# Patient Record
Sex: MALE | Race: WHITE | NOT HISPANIC OR LATINO
[De-identification: names, ages, dates, MRNs, and addresses within clinical notes are randomized per-mention and may not be internally consistent; named-entity substitution may affect disease eponyms.]

---

## 2017-02-13 ENCOUNTER — APPOINTMENT (OUTPATIENT)
Dept: DERMATOLOGY | Facility: CLINIC | Age: 72
End: 2017-02-13

## 2017-02-13 ENCOUNTER — RECORD ABSTRACTING (OUTPATIENT)
Age: 72
End: 2017-02-13

## 2017-02-13 DIAGNOSIS — L57.8 OTHER SKIN CHANGES DUE TO CHRONIC EXPOSURE TO NONIONIZING RADIATION: ICD-10-CM

## 2017-02-13 DIAGNOSIS — L21.8 OTHER SEBORRHEIC DERMATITIS: ICD-10-CM

## 2017-07-08 ENCOUNTER — RX RENEWAL (OUTPATIENT)
Age: 72
End: 2017-07-08

## 2017-08-24 ENCOUNTER — APPOINTMENT (OUTPATIENT)
Dept: DERMATOLOGY | Facility: CLINIC | Age: 72
End: 2017-08-24
Payer: MEDICARE

## 2017-08-24 VITALS — BODY MASS INDEX: 30.35 KG/M2 | WEIGHT: 230 LBS

## 2017-08-24 VITALS — BODY MASS INDEX: 30.48 KG/M2 | WEIGHT: 230 LBS | HEIGHT: 73 IN

## 2017-08-24 PROCEDURE — 99213 OFFICE O/P EST LOW 20 MIN: CPT

## 2017-09-07 ENCOUNTER — OTHER (OUTPATIENT)
Age: 72
End: 2017-09-07

## 2017-09-11 ENCOUNTER — APPOINTMENT (OUTPATIENT)
Dept: DERMATOLOGY | Facility: CLINIC | Age: 72
End: 2017-09-11

## 2017-09-11 ENCOUNTER — RX RENEWAL (OUTPATIENT)
Age: 72
End: 2017-09-11

## 2017-10-11 ENCOUNTER — APPOINTMENT (OUTPATIENT)
Dept: DERMATOLOGY | Facility: CLINIC | Age: 72
End: 2017-10-11
Payer: MEDICARE

## 2017-10-11 PROCEDURE — 99214 OFFICE O/P EST MOD 30 MIN: CPT

## 2017-10-11 RX ORDER — MOMETASONE FUROATE 1 MG/ML
0.1 LOTION TOPICAL
Refills: 0 | Status: DISCONTINUED | COMMUNITY
End: 2017-10-11

## 2017-10-11 RX ORDER — FLURANDRENOLIDE 4 UG/CM2
TAPE TOPICAL
Refills: 0 | Status: DISCONTINUED | COMMUNITY
End: 2017-10-11

## 2017-10-11 RX ORDER — CLOBETASOL PROPIONATE 0.5 MG/ML
0.05 SOLUTION TOPICAL
Refills: 0 | Status: DISCONTINUED | COMMUNITY
End: 2017-10-11

## 2017-10-11 RX ORDER — HYDROCORTISONE VALERATE 2 MG/G
0.2 OINTMENT TOPICAL
Refills: 0 | Status: DISCONTINUED | COMMUNITY
End: 2017-10-11

## 2017-10-12 LAB
ALBUMIN SERPL ELPH-MCNC: 4.2 G/DL
ALP BLD-CCNC: 48 U/L
ALT SERPL-CCNC: 16 U/L
ANION GAP SERPL CALC-SCNC: 13 MMOL/L
AST SERPL-CCNC: 12 U/L
BASOPHILS # BLD AUTO: 0.03 K/UL
BASOPHILS NFR BLD AUTO: 0.5 %
BILIRUB SERPL-MCNC: 0.4 MG/DL
BUN SERPL-MCNC: 19 MG/DL
CALCIUM SERPL-MCNC: 9.6 MG/DL
CHLORIDE SERPL-SCNC: 104 MMOL/L
CO2 SERPL-SCNC: 25 MMOL/L
CREAT SERPL-MCNC: 0.87 MG/DL
EOSINOPHIL # BLD AUTO: 0.09 K/UL
EOSINOPHIL NFR BLD AUTO: 1.5 %
GLUCOSE SERPL-MCNC: 170 MG/DL
HCT VFR BLD CALC: 40.4 %
HGB BLD-MCNC: 14 G/DL
IMM GRANULOCYTES NFR BLD AUTO: 0.2 %
LYMPHOCYTES # BLD AUTO: 1.4 K/UL
LYMPHOCYTES NFR BLD AUTO: 24.1 %
MAN DIFF?: NORMAL
MCHC RBC-ENTMCNC: 31.7 PG
MCHC RBC-ENTMCNC: 34.7 GM/DL
MCV RBC AUTO: 91.4 FL
MONOCYTES # BLD AUTO: 0.76 K/UL
MONOCYTES NFR BLD AUTO: 13.1 %
NEUTROPHILS # BLD AUTO: 3.53 K/UL
NEUTROPHILS NFR BLD AUTO: 60.6 %
PLATELET # BLD AUTO: 224 K/UL
POTASSIUM SERPL-SCNC: 4.8 MMOL/L
PROT SERPL-MCNC: 7.4 G/DL
RBC # BLD: 4.42 M/UL
RBC # FLD: 13 %
SODIUM SERPL-SCNC: 142 MMOL/L
WBC # FLD AUTO: 5.82 K/UL

## 2017-10-18 ENCOUNTER — LABORATORY RESULT (OUTPATIENT)
Age: 72
End: 2017-10-18

## 2017-10-25 ENCOUNTER — APPOINTMENT (OUTPATIENT)
Dept: DERMATOLOGY | Facility: CLINIC | Age: 72
End: 2017-10-25
Payer: MEDICARE

## 2017-10-25 PROCEDURE — 99214 OFFICE O/P EST MOD 30 MIN: CPT

## 2017-10-25 RX ORDER — METHOTREXATE 2.5 MG/1
2.5 TABLET ORAL
Qty: 1 | Refills: 0 | Status: COMPLETED | COMMUNITY
Start: 2017-10-11 | End: 2017-10-25

## 2017-11-02 ENCOUNTER — APPOINTMENT (OUTPATIENT)
Dept: DERMATOLOGY | Facility: CLINIC | Age: 72
End: 2017-11-02
Payer: MEDICARE

## 2017-11-02 VITALS — BODY MASS INDEX: 30.48 KG/M2 | WEIGHT: 230 LBS | HEIGHT: 73 IN

## 2017-11-02 PROCEDURE — 99213 OFFICE O/P EST LOW 20 MIN: CPT

## 2017-11-02 RX ORDER — USTEKINUMAB 90 MG/ML
90 INJECTION, SOLUTION SUBCUTANEOUS
Qty: 2 | Refills: 0 | Status: DISCONTINUED | COMMUNITY
End: 2017-11-02

## 2017-11-02 RX ORDER — METFORMIN ER 500 MG 500 MG/1
500 TABLET ORAL
Qty: 180 | Refills: 0 | Status: DISCONTINUED | COMMUNITY
Start: 2017-08-29

## 2017-11-02 RX ORDER — PREDNISOLONE ACETATE 10 MG/ML
1 SUSPENSION/ DROPS OPHTHALMIC
Qty: 5 | Refills: 0 | Status: DISCONTINUED | COMMUNITY
Start: 2017-07-12

## 2017-11-13 LAB
ALBUMIN SERPL ELPH-MCNC: 4.1 G/DL
ALP BLD-CCNC: 46 U/L
ALT SERPL-CCNC: 21 U/L
AST SERPL-CCNC: 15 U/L
BASOPHILS # BLD AUTO: 0.03 K/UL
BASOPHILS NFR BLD AUTO: 0.6 %
BILIRUB DIRECT SERPL-MCNC: 0.1 MG/DL
BILIRUB INDIRECT SERPL-MCNC: 0.2 MG/DL
BILIRUB SERPL-MCNC: 0.3 MG/DL
EOSINOPHIL # BLD AUTO: 0.1 K/UL
EOSINOPHIL NFR BLD AUTO: 1.9 %
HCT VFR BLD CALC: 38.3 %
HGB BLD-MCNC: 13.1 G/DL
IMM GRANULOCYTES NFR BLD AUTO: 0.6 %
LYMPHOCYTES # BLD AUTO: 1.41 K/UL
LYMPHOCYTES NFR BLD AUTO: 26.8 %
MAN DIFF?: NORMAL
MCHC RBC-ENTMCNC: 31.6 PG
MCHC RBC-ENTMCNC: 34.2 GM/DL
MCV RBC AUTO: 92.5 FL
MONOCYTES # BLD AUTO: 0.51 K/UL
MONOCYTES NFR BLD AUTO: 9.7 %
NEUTROPHILS # BLD AUTO: 3.18 K/UL
NEUTROPHILS NFR BLD AUTO: 60.4 %
PLATELET # BLD AUTO: 199 K/UL
PROT SERPL-MCNC: 6.9 G/DL
RBC # BLD: 4.14 M/UL
RBC # FLD: 13.1 %
WBC # FLD AUTO: 5.26 K/UL

## 2017-11-20 ENCOUNTER — APPOINTMENT (OUTPATIENT)
Dept: ORTHOPEDIC SURGERY | Facility: CLINIC | Age: 72
End: 2017-11-20

## 2017-11-20 ENCOUNTER — APPOINTMENT (OUTPATIENT)
Dept: DERMATOLOGY | Facility: CLINIC | Age: 72
End: 2017-11-20
Payer: MEDICARE

## 2017-11-20 PROCEDURE — 99213 OFFICE O/P EST LOW 20 MIN: CPT

## 2017-12-18 LAB
ALBUMIN SERPL ELPH-MCNC: 4.2 G/DL
ALP BLD-CCNC: 46 U/L
ALT SERPL-CCNC: 16 U/L
AST SERPL-CCNC: 16 U/L
BASOPHILS # BLD AUTO: 0.03 K/UL
BASOPHILS NFR BLD AUTO: 0.5 %
BILIRUB DIRECT SERPL-MCNC: 0.1 MG/DL
BILIRUB INDIRECT SERPL-MCNC: 0.2 MG/DL
BILIRUB SERPL-MCNC: 0.3 MG/DL
EOSINOPHIL # BLD AUTO: 0.1 K/UL
EOSINOPHIL NFR BLD AUTO: 1.7 %
HCT VFR BLD CALC: 39.1 %
HGB BLD-MCNC: 13.1 G/DL
IMM GRANULOCYTES NFR BLD AUTO: 0.3 %
LYMPHOCYTES # BLD AUTO: 1.3 K/UL
LYMPHOCYTES NFR BLD AUTO: 22.6 %
MAN DIFF?: NORMAL
MCHC RBC-ENTMCNC: 31.5 PG
MCHC RBC-ENTMCNC: 33.5 GM/DL
MCV RBC AUTO: 94 FL
MONOCYTES # BLD AUTO: 0.78 K/UL
MONOCYTES NFR BLD AUTO: 13.6 %
NEUTROPHILS # BLD AUTO: 3.52 K/UL
NEUTROPHILS NFR BLD AUTO: 61.3 %
PLATELET # BLD AUTO: 222 K/UL
PROT SERPL-MCNC: 7.4 G/DL
RBC # BLD: 4.16 M/UL
RBC # FLD: 13.7 %
WBC # FLD AUTO: 5.75 K/UL

## 2017-12-20 ENCOUNTER — APPOINTMENT (OUTPATIENT)
Dept: DERMATOLOGY | Facility: CLINIC | Age: 72
End: 2017-12-20
Payer: MEDICARE

## 2017-12-20 PROCEDURE — 96910 PHOTCHMTX TAR&UVB/PTRLTM&UVB: CPT

## 2017-12-20 PROCEDURE — 99213 OFFICE O/P EST LOW 20 MIN: CPT | Mod: 25

## 2018-01-24 ENCOUNTER — APPOINTMENT (OUTPATIENT)
Dept: DERMATOLOGY | Facility: CLINIC | Age: 73
End: 2018-01-24
Payer: MEDICARE

## 2018-01-24 VITALS — BODY MASS INDEX: 29.82 KG/M2 | WEIGHT: 225 LBS | HEIGHT: 73 IN

## 2018-01-24 LAB
ALBUMIN SERPL ELPH-MCNC: 4 G/DL
ALP BLD-CCNC: 41 U/L
ALT SERPL-CCNC: 28 U/L
AST SERPL-CCNC: 21 U/L
BASOPHILS # BLD AUTO: 0.02 K/UL
BASOPHILS NFR BLD AUTO: 0.4 %
BILIRUB DIRECT SERPL-MCNC: 0.1 MG/DL
BILIRUB INDIRECT SERPL-MCNC: 0.1 MG/DL
BILIRUB SERPL-MCNC: 0.2 MG/DL
EOSINOPHIL # BLD AUTO: 0.1 K/UL
EOSINOPHIL NFR BLD AUTO: 1.8 %
HCT VFR BLD CALC: 38.9 %
HGB BLD-MCNC: 12.9 G/DL
IMM GRANULOCYTES NFR BLD AUTO: 0.2 %
LYMPHOCYTES # BLD AUTO: 1.19 K/UL
LYMPHOCYTES NFR BLD AUTO: 20.9 %
MAN DIFF?: NORMAL
MCHC RBC-ENTMCNC: 31.6 PG
MCHC RBC-ENTMCNC: 33.2 GM/DL
MCV RBC AUTO: 95.3 FL
MONOCYTES # BLD AUTO: 0.53 K/UL
MONOCYTES NFR BLD AUTO: 9.3 %
NEUTROPHILS # BLD AUTO: 3.85 K/UL
NEUTROPHILS NFR BLD AUTO: 67.4 %
PLATELET # BLD AUTO: 209 K/UL
PROT SERPL-MCNC: 7.2 G/DL
RBC # BLD: 4.08 M/UL
RBC # FLD: 13.8 %
WBC # FLD AUTO: 5.7 K/UL

## 2018-01-24 PROCEDURE — 99214 OFFICE O/P EST MOD 30 MIN: CPT

## 2018-02-28 ENCOUNTER — APPOINTMENT (OUTPATIENT)
Dept: DERMATOLOGY | Facility: CLINIC | Age: 73
End: 2018-02-28
Payer: MEDICARE

## 2018-02-28 PROCEDURE — 99213 OFFICE O/P EST LOW 20 MIN: CPT

## 2018-03-01 ENCOUNTER — LABORATORY RESULT (OUTPATIENT)
Age: 73
End: 2018-03-01

## 2018-03-05 LAB
ALBUMIN SERPL ELPH-MCNC: 4 G/DL
ALP BLD-CCNC: 43 U/L
ALT SERPL-CCNC: 34 U/L
AST SERPL-CCNC: 21 U/L
BILIRUB DIRECT SERPL-MCNC: 0.1 MG/DL
BILIRUB INDIRECT SERPL-MCNC: 0.2 MG/DL
BILIRUB SERPL-MCNC: 0.3 MG/DL
PROT SERPL-MCNC: 7 G/DL

## 2018-03-06 ENCOUNTER — APPOINTMENT (OUTPATIENT)
Dept: DERMATOLOGY | Facility: CLINIC | Age: 73
End: 2018-03-06
Payer: MEDICARE

## 2018-03-06 ENCOUNTER — RX RENEWAL (OUTPATIENT)
Age: 73
End: 2018-03-06

## 2018-03-06 PROCEDURE — 96910 PHOTCHMTX TAR&UVB/PTRLTM&UVB: CPT

## 2018-03-08 ENCOUNTER — APPOINTMENT (OUTPATIENT)
Dept: DERMATOLOGY | Facility: CLINIC | Age: 73
End: 2018-03-08

## 2018-03-09 ENCOUNTER — APPOINTMENT (OUTPATIENT)
Dept: DERMATOLOGY | Facility: CLINIC | Age: 73
End: 2018-03-09

## 2018-03-09 ENCOUNTER — APPOINTMENT (OUTPATIENT)
Dept: DERMATOLOGY | Facility: CLINIC | Age: 73
End: 2018-03-09
Payer: MEDICARE

## 2018-03-09 PROCEDURE — 96910 PHOTCHMTX TAR&UVB/PTRLTM&UVB: CPT

## 2018-03-12 ENCOUNTER — APPOINTMENT (OUTPATIENT)
Dept: DERMATOLOGY | Facility: CLINIC | Age: 73
End: 2018-03-12

## 2018-03-14 ENCOUNTER — APPOINTMENT (OUTPATIENT)
Dept: DERMATOLOGY | Facility: CLINIC | Age: 73
End: 2018-03-14

## 2018-03-20 ENCOUNTER — LABORATORY RESULT (OUTPATIENT)
Age: 73
End: 2018-03-20

## 2018-03-20 ENCOUNTER — APPOINTMENT (OUTPATIENT)
Dept: DERMATOLOGY | Facility: CLINIC | Age: 73
End: 2018-03-20
Payer: MEDICARE

## 2018-03-20 PROCEDURE — 12032 INTMD RPR S/A/T/EXT 2.6-7.5: CPT

## 2018-03-20 PROCEDURE — 11404 EXC TR-EXT B9+MARG 3.1-4 CM: CPT

## 2018-03-20 RX ORDER — MENTHOL/CAMPHOR 0.5 %-0.5%
1000 LOTION (ML) TOPICAL
Refills: 0 | Status: ACTIVE | COMMUNITY

## 2018-03-26 LAB — CORE LAB BIOPSY: NORMAL

## 2018-04-03 ENCOUNTER — APPOINTMENT (OUTPATIENT)
Dept: DERMATOLOGY | Facility: CLINIC | Age: 73
End: 2018-04-03
Payer: MEDICARE

## 2018-04-03 VITALS — BODY MASS INDEX: 29.82 KG/M2 | WEIGHT: 225 LBS | HEIGHT: 73 IN

## 2018-04-03 PROCEDURE — 99213 OFFICE O/P EST LOW 20 MIN: CPT

## 2018-04-04 ENCOUNTER — APPOINTMENT (OUTPATIENT)
Dept: ORTHOPEDIC SURGERY | Facility: CLINIC | Age: 73
End: 2018-04-04
Payer: MEDICARE

## 2018-04-05 RX ORDER — CALCIPOTRIENE 50 UG/G
0.01 AEROSOL, FOAM TOPICAL
Qty: 2 | Refills: 4 | Status: DISCONTINUED | COMMUNITY
Start: 2018-04-03 | End: 2018-04-05

## 2018-04-10 ENCOUNTER — RX RENEWAL (OUTPATIENT)
Age: 73
End: 2018-04-10

## 2018-04-10 ENCOUNTER — APPOINTMENT (OUTPATIENT)
Dept: ORTHOPEDIC SURGERY | Facility: CLINIC | Age: 73
End: 2018-04-10
Payer: MEDICARE

## 2018-04-10 VITALS
DIASTOLIC BLOOD PRESSURE: 80 MMHG | BODY MASS INDEX: 29.82 KG/M2 | WEIGHT: 225 LBS | HEIGHT: 73 IN | HEART RATE: 72 BPM | SYSTOLIC BLOOD PRESSURE: 121 MMHG

## 2018-04-10 PROCEDURE — 73502 X-RAY EXAM HIP UNI 2-3 VIEWS: CPT | Mod: RT

## 2018-04-10 PROCEDURE — 72100 X-RAY EXAM L-S SPINE 2/3 VWS: CPT

## 2018-04-10 PROCEDURE — 99215 OFFICE O/P EST HI 40 MIN: CPT

## 2018-05-03 ENCOUNTER — APPOINTMENT (OUTPATIENT)
Dept: DERMATOLOGY | Facility: CLINIC | Age: 73
End: 2018-05-03

## 2018-05-07 ENCOUNTER — APPOINTMENT (OUTPATIENT)
Dept: DERMATOLOGY | Facility: CLINIC | Age: 73
End: 2018-05-07
Payer: MEDICARE

## 2018-05-07 ENCOUNTER — RX RENEWAL (OUTPATIENT)
Age: 73
End: 2018-05-07

## 2018-05-07 PROCEDURE — 99213 OFFICE O/P EST LOW 20 MIN: CPT

## 2018-05-18 ENCOUNTER — OUTPATIENT (OUTPATIENT)
Dept: OUTPATIENT SERVICES | Facility: HOSPITAL | Age: 73
LOS: 1 days | End: 2018-05-18
Payer: MEDICARE

## 2018-05-18 ENCOUNTER — APPOINTMENT (OUTPATIENT)
Dept: CT IMAGING | Facility: CLINIC | Age: 73
End: 2018-05-18

## 2018-05-18 DIAGNOSIS — Z98.89 OTHER SPECIFIED POSTPROCEDURAL STATES: Chronic | ICD-10-CM

## 2018-05-18 DIAGNOSIS — I25.10 ATHEROSCLEROTIC HEART DISEASE OF NATIVE CORONARY ARTERY WITHOUT ANGINA PECTORIS: Chronic | ICD-10-CM

## 2018-05-18 DIAGNOSIS — Z00.8 ENCOUNTER FOR OTHER GENERAL EXAMINATION: ICD-10-CM

## 2018-05-18 DIAGNOSIS — Z12.11 ENCOUNTER FOR SCREENING FOR MALIGNANT NEOPLASM OF COLON: Chronic | ICD-10-CM

## 2018-05-18 PROCEDURE — 73700 CT LOWER EXTREMITY W/O DYE: CPT

## 2018-05-18 PROCEDURE — 73700 CT LOWER EXTREMITY W/O DYE: CPT | Mod: 26,RT

## 2018-05-22 ENCOUNTER — APPOINTMENT (OUTPATIENT)
Dept: ORTHOPEDIC SURGERY | Facility: CLINIC | Age: 73
End: 2018-05-22

## 2018-06-13 ENCOUNTER — APPOINTMENT (OUTPATIENT)
Dept: DERMATOLOGY | Facility: CLINIC | Age: 73
End: 2018-06-13
Payer: MEDICARE

## 2018-06-13 PROCEDURE — 99213 OFFICE O/P EST LOW 20 MIN: CPT

## 2018-06-20 ENCOUNTER — OUTPATIENT (OUTPATIENT)
Dept: OUTPATIENT SERVICES | Facility: HOSPITAL | Age: 73
LOS: 1 days | Discharge: HOME | End: 2018-06-20
Payer: MEDICARE

## 2018-06-20 VITALS
HEART RATE: 61 BPM | TEMPERATURE: 97 F | RESPIRATION RATE: 18 BRPM | SYSTOLIC BLOOD PRESSURE: 104 MMHG | WEIGHT: 229.06 LBS | DIASTOLIC BLOOD PRESSURE: 63 MMHG | OXYGEN SATURATION: 98 % | HEIGHT: 72 IN

## 2018-06-20 DIAGNOSIS — M16.11 UNILATERAL PRIMARY OSTEOARTHRITIS, RIGHT HIP: ICD-10-CM

## 2018-06-20 DIAGNOSIS — I25.10 ATHEROSCLEROTIC HEART DISEASE OF NATIVE CORONARY ARTERY WITHOUT ANGINA PECTORIS: Chronic | ICD-10-CM

## 2018-06-20 DIAGNOSIS — Z96.652 PRESENCE OF LEFT ARTIFICIAL KNEE JOINT: Chronic | ICD-10-CM

## 2018-06-20 DIAGNOSIS — Z12.11 ENCOUNTER FOR SCREENING FOR MALIGNANT NEOPLASM OF COLON: Chronic | ICD-10-CM

## 2018-06-20 DIAGNOSIS — Z98.89 OTHER SPECIFIED POSTPROCEDURAL STATES: Chronic | ICD-10-CM

## 2018-06-20 LAB
ALLERGY+IMMUNOLOGY DIAG STUDY NOTE: SIGNIFICANT CHANGE UP
ANION GAP SERPL CALC-SCNC: 3 MMOL/L — LOW (ref 5–17)
APPEARANCE UR: CLEAR — SIGNIFICANT CHANGE UP
BACTERIA # UR AUTO: NEGATIVE — SIGNIFICANT CHANGE UP
BASOPHILS # BLD AUTO: 0.04 K/UL — SIGNIFICANT CHANGE UP (ref 0–0.2)
BASOPHILS NFR BLD AUTO: 0.6 % — SIGNIFICANT CHANGE UP (ref 0–2)
BILIRUB UR-MCNC: NEGATIVE — SIGNIFICANT CHANGE UP
BUN SERPL-MCNC: 14 MG/DL — SIGNIFICANT CHANGE UP (ref 7–23)
CALCIUM SERPL-MCNC: 8.8 MG/DL — SIGNIFICANT CHANGE UP (ref 8.5–10.1)
CHLORIDE SERPL-SCNC: 106 MMOL/L — SIGNIFICANT CHANGE UP (ref 96–108)
CO2 SERPL-SCNC: 31 MMOL/L — SIGNIFICANT CHANGE UP (ref 22–31)
COLOR SPEC: YELLOW — SIGNIFICANT CHANGE UP
CREAT SERPL-MCNC: 0.78 MG/DL — SIGNIFICANT CHANGE UP (ref 0.5–1.3)
DIFF PNL FLD: NEGATIVE — SIGNIFICANT CHANGE UP
EOSINOPHIL # BLD AUTO: 0.12 K/UL — SIGNIFICANT CHANGE UP (ref 0–0.5)
EOSINOPHIL NFR BLD AUTO: 1.8 % — SIGNIFICANT CHANGE UP (ref 0–6)
EPI CELLS # UR: NEGATIVE — SIGNIFICANT CHANGE UP
GLUCOSE SERPL-MCNC: 144 MG/DL — HIGH (ref 70–99)
GLUCOSE UR QL: NEGATIVE MG/DL — SIGNIFICANT CHANGE UP
HCT VFR BLD CALC: 39 % — SIGNIFICANT CHANGE UP (ref 39–50)
HGB BLD-MCNC: 13.3 G/DL — SIGNIFICANT CHANGE UP (ref 13–17)
IMM GRANULOCYTES NFR BLD AUTO: 0.3 % — SIGNIFICANT CHANGE UP (ref 0–1.5)
KETONES UR-MCNC: NEGATIVE — SIGNIFICANT CHANGE UP
LEUKOCYTE ESTERASE UR-ACNC: ABNORMAL
LYMPHOCYTES # BLD AUTO: 1.3 K/UL — SIGNIFICANT CHANGE UP (ref 1–3.3)
LYMPHOCYTES # BLD AUTO: 19.8 % — SIGNIFICANT CHANGE UP (ref 13–44)
MCHC RBC-ENTMCNC: 31.5 PG — SIGNIFICANT CHANGE UP (ref 27–34)
MCHC RBC-ENTMCNC: 34.1 GM/DL — SIGNIFICANT CHANGE UP (ref 32–36)
MCV RBC AUTO: 92.4 FL — SIGNIFICANT CHANGE UP (ref 80–100)
MONOCYTES # BLD AUTO: 0.74 K/UL — SIGNIFICANT CHANGE UP (ref 0–0.9)
MONOCYTES NFR BLD AUTO: 11.3 % — SIGNIFICANT CHANGE UP (ref 2–14)
MRSA PCR RESULT.: SIGNIFICANT CHANGE UP
NEUTROPHILS # BLD AUTO: 4.34 K/UL — SIGNIFICANT CHANGE UP (ref 1.8–7.4)
NEUTROPHILS NFR BLD AUTO: 66.2 % — SIGNIFICANT CHANGE UP (ref 43–77)
NITRITE UR-MCNC: NEGATIVE — SIGNIFICANT CHANGE UP
NRBC # BLD: 0 /100 WBCS — SIGNIFICANT CHANGE UP (ref 0–0)
PH UR: 6 — SIGNIFICANT CHANGE UP (ref 5–8)
PLATELET # BLD AUTO: 210 K/UL — SIGNIFICANT CHANGE UP (ref 150–400)
POTASSIUM SERPL-MCNC: 5 MMOL/L — SIGNIFICANT CHANGE UP (ref 3.5–5.3)
POTASSIUM SERPL-SCNC: 5 MMOL/L — SIGNIFICANT CHANGE UP (ref 3.5–5.3)
PROT UR-MCNC: NEGATIVE MG/DL — SIGNIFICANT CHANGE UP
RBC # BLD: 4.22 M/UL — SIGNIFICANT CHANGE UP (ref 4.2–5.8)
RBC # FLD: 13.1 % — SIGNIFICANT CHANGE UP (ref 10.3–14.5)
RBC CASTS # UR COMP ASSIST: SIGNIFICANT CHANGE UP /HPF (ref 0–4)
S AUREUS DNA NOSE QL NAA+PROBE: SIGNIFICANT CHANGE UP
SODIUM SERPL-SCNC: 140 MMOL/L — SIGNIFICANT CHANGE UP (ref 135–145)
SP GR SPEC: 1.01 — SIGNIFICANT CHANGE UP (ref 1.01–1.02)
UROBILINOGEN FLD QL: NEGATIVE MG/DL — SIGNIFICANT CHANGE UP
WBC # BLD: 6.56 K/UL — SIGNIFICANT CHANGE UP (ref 3.8–10.5)
WBC # FLD AUTO: 6.56 K/UL — SIGNIFICANT CHANGE UP (ref 3.8–10.5)
WBC UR QL: SIGNIFICANT CHANGE UP

## 2018-06-20 PROCEDURE — 93010 ELECTROCARDIOGRAM REPORT: CPT

## 2018-06-20 PROCEDURE — 73502 X-RAY EXAM HIP UNI 2-3 VIEWS: CPT | Mod: 26

## 2018-06-20 PROCEDURE — 71046 X-RAY EXAM CHEST 2 VIEWS: CPT | Mod: 26

## 2018-06-20 NOTE — H&P PST ADULT - TEACHING/LEARNING LEARNING PREFERENCES
pictorial/verbal instruction/group instruction/audio/skill demonstration/written material/individual instruction/video/computer/internet

## 2018-06-20 NOTE — H&P PST ADULT - FAMILY HISTORY
Mother  Still living? No  Family history of heart failure, Age at diagnosis: Age Unknown  Family history of arthritis, Age at diagnosis: Age Unknown  Family history of asthma, Age at diagnosis: Age Unknown     Father  Still living? No  Family history of cancer of GI tract, Age at diagnosis: Age Unknown

## 2018-06-20 NOTE — H&P PST ADULT - PMH
BPH (benign prostatic hypertrophy)    CAD (coronary artery disease)    Cataract of both eyes, unspecified cataract type    Diabetes    History of colon polyps    Hyperlipidemia    KENDRA (obstructive sleep apnea)  does not use CPAP  Pain of both hip joints    Primary osteoarthritis of left knee  history of. knee replacement  Primary osteoarthritis of right hip    Psoriasis    Sleep apnea  does not use device  Spinal stenosis of lumbar region, unspecified whether neurogenic claudication present    Tinnitus of both ears    Urinary frequency

## 2018-06-20 NOTE — H&P PST ADULT - PSH
CAD (coronary artery disease)  cardiac cath 7/9/15 negative  Encounter for screening colonoscopy    H/O total knee replacement, left  2016  S/P left knee arthroscopy

## 2018-06-20 NOTE — H&P PST ADULT - HISTORY OF PRESENT ILLNESS
73 years old male with osteoarthritis of right hip. Constant aching pain to right groin for " a month and a half". Patient saw Dr. Leon SHEPARD. He had x-rays of hip and MRI.  Planned right hip replacement.

## 2018-06-20 NOTE — H&P PST ADULT - ASSESSMENT
73 years old male present to PST prior to right hip replacement with Dr. Quintero III.  Plan   1. NPO after midnight  2. Take the following medications with sips of water on the day of procedure: Tamsulosin  3. Use E-Z sponge as directed  4. Use Mupirocin as directed.  5. Drink a quart of extra  fluids the day before your surgery.  6 Medical clearance with Dr. Smart  7. CBC, BMP, Urinalysis, Type and Screen, MRSA sent to lab  8. EKG and right hip x-ray done  9. PT/ INR and PTT on the day of surgery    CAPRINI SCORE [CLOT]    AGE RELATED RISK FACTORS                                                       MOBILITY RELATED FACTORS  [ ] Age 41-60 years                                            (1 Point)                  [ ] Bed rest                                                        (1 Point)  [ x] Age: 61-74 years                                           (2 Points)                 [ ] Plaster cast                                                   (2 Points)  [ ] Age= 75 years                                              (3 Points)                 [ ] Bed bound for more than 72 hours                 (2 Points)    DISEASE RELATED RISK FACTORS                                               GENDER SPECIFIC FACTORS  [ ] Edema in the lower extremities                       (1 Point)                  [ ] Pregnancy                                                     (1 Point)  [ ] Varicose veins                                               (1 Point)                  [ ] Post-partum < 6 weeks                                   (1 Point)             [x ] BMI > 25 Kg/m2                                            (1 Point)                  [ ] Hormonal therapy  or oral contraception          (1 Point)                 [ ] Sepsis (in the previous month)                        (1 Point)                  [ ] History of pregnancy complications                 (1 point)  [ ] Pneumonia or serious lung disease                                               [ ] Unexplained or recurrent                     (1 Point)           (in the previous month)                               (1 Point)  [ ] Abnormal pulmonary function test                     (1 Point)                 SURGERY RELATED RISK FACTORS  [ ] Acute myocardial infarction                              (1 Point)                 [ ]  Section                                             (1 Point)  [ ] Congestive heart failure (in the previous month)  (1 Point)               [ ] Minor surgery                                                  (1 Point)   [ ] Inflammatory bowel disease                             (1 Point)                 [ ] Arthroscopic surgery                                        (2 Points)  [ ] Central venous access                                      (2 Points)                [ ] General surgery lasting more than 45 minutes   (2 Points)       [ ] Stroke (in the previous month)                          (5 Points)               [x ] Elective arthroplasty                                         (5 Points)                                                                                                                                               HEMATOLOGY RELATED FACTORS                                                 TRAUMA RELATED RISK FACTORS  [ ] Prior episodes of VTE                                     (3 Points)                 [ ] Fracture of the hip, pelvis, or leg                       (5 Points)  [ ] Positive family history for VTE                         (3 Points)                 [ ] Acute spinal cord injury (in the previous month)  (5 Points)  [ ] Prothrombin 13837 A                                     (3 Points)                 [ ] Paralysis  (less than 1 month)                             (5 Points)  [ ] Factor V Leiden                                             (3 Points)                  [ ] Multiple Trauma within 1 month                        (5 Points)  [ ] Lupus anticoagulants                                     (3 Points)                                                           [ ] Anticardiolipin antibodies                               (3 Points)                                                       [ ] High homocysteine in the blood                      (3 Points)                                             [ ] Other congenital or acquired thrombophilia      (3 Points)                                                [ ] Heparin induced thrombocytopenia                  (3 Points)                                          Total Score [     8     ]

## 2018-07-02 RX ORDER — PANTOPRAZOLE SODIUM 20 MG/1
40 TABLET, DELAYED RELEASE ORAL ONCE
Qty: 0 | Refills: 0 | Status: COMPLETED | OUTPATIENT
Start: 2018-07-06 | End: 2018-07-06

## 2018-07-05 ENCOUNTER — RX RENEWAL (OUTPATIENT)
Age: 73
End: 2018-07-05

## 2018-07-05 RX ORDER — OXYCODONE HYDROCHLORIDE 5 MG/1
10 TABLET ORAL EVERY 12 HOURS
Qty: 0 | Refills: 0 | Status: DISCONTINUED | OUTPATIENT
Start: 2018-07-06 | End: 2018-07-10

## 2018-07-05 RX ORDER — SODIUM CHLORIDE 9 MG/ML
3 INJECTION INTRAMUSCULAR; INTRAVENOUS; SUBCUTANEOUS EVERY 8 HOURS
Qty: 0 | Refills: 0 | Status: DISCONTINUED | OUTPATIENT
Start: 2018-07-06 | End: 2018-07-10

## 2018-07-05 RX ORDER — OXYCODONE HYDROCHLORIDE 5 MG/1
5 TABLET ORAL EVERY 4 HOURS
Qty: 0 | Refills: 0 | Status: DISCONTINUED | OUTPATIENT
Start: 2018-07-06 | End: 2018-07-10

## 2018-07-05 RX ORDER — ACETAMINOPHEN 500 MG
975 TABLET ORAL ONCE
Qty: 0 | Refills: 0 | Status: COMPLETED | OUTPATIENT
Start: 2018-07-06 | End: 2018-07-06

## 2018-07-05 RX ORDER — ACETAMINOPHEN 500 MG
650 TABLET ORAL EVERY 6 HOURS
Qty: 0 | Refills: 0 | Status: COMPLETED | OUTPATIENT
Start: 2018-07-06 | End: 2018-07-10

## 2018-07-05 RX ORDER — HYDROMORPHONE HYDROCHLORIDE 2 MG/ML
0.5 INJECTION INTRAMUSCULAR; INTRAVENOUS; SUBCUTANEOUS
Qty: 0 | Refills: 0 | Status: DISCONTINUED | OUTPATIENT
Start: 2018-07-06 | End: 2018-07-10

## 2018-07-05 RX ORDER — OXYCODONE HYDROCHLORIDE 5 MG/1
10 TABLET ORAL EVERY 4 HOURS
Qty: 0 | Refills: 0 | Status: DISCONTINUED | OUTPATIENT
Start: 2018-07-06 | End: 2018-07-10

## 2018-07-05 RX ORDER — FAMOTIDINE 10 MG/ML
20 INJECTION INTRAVENOUS ONCE
Qty: 0 | Refills: 0 | Status: COMPLETED | OUTPATIENT
Start: 2018-07-06 | End: 2018-07-06

## 2018-07-05 RX ORDER — OXYCODONE HYDROCHLORIDE 5 MG/1
10 TABLET ORAL ONCE
Qty: 0 | Refills: 0 | Status: DISCONTINUED | OUTPATIENT
Start: 2018-07-06 | End: 2018-07-06

## 2018-07-06 ENCOUNTER — RESULT REVIEW (OUTPATIENT)
Age: 73
End: 2018-07-06

## 2018-07-06 ENCOUNTER — INPATIENT (INPATIENT)
Facility: HOSPITAL | Age: 73
LOS: 3 days | Discharge: NURSING, REHAB OR LONG-TERM CARE | End: 2018-07-10
Attending: ORTHOPAEDIC SURGERY | Admitting: ORTHOPAEDIC SURGERY
Payer: MEDICARE

## 2018-07-06 ENCOUNTER — TRANSCRIPTION ENCOUNTER (OUTPATIENT)
Age: 73
End: 2018-07-06

## 2018-07-06 VITALS
WEIGHT: 229.06 LBS | TEMPERATURE: 98 F | RESPIRATION RATE: 16 BRPM | SYSTOLIC BLOOD PRESSURE: 111 MMHG | OXYGEN SATURATION: 94 % | HEART RATE: 73 BPM | HEIGHT: 72 IN | DIASTOLIC BLOOD PRESSURE: 68 MMHG

## 2018-07-06 DIAGNOSIS — Z12.11 ENCOUNTER FOR SCREENING FOR MALIGNANT NEOPLASM OF COLON: Chronic | ICD-10-CM

## 2018-07-06 DIAGNOSIS — Z96.652 PRESENCE OF LEFT ARTIFICIAL KNEE JOINT: Chronic | ICD-10-CM

## 2018-07-06 DIAGNOSIS — I25.10 ATHEROSCLEROTIC HEART DISEASE OF NATIVE CORONARY ARTERY WITHOUT ANGINA PECTORIS: Chronic | ICD-10-CM

## 2018-07-06 DIAGNOSIS — Z98.89 OTHER SPECIFIED POSTPROCEDURAL STATES: Chronic | ICD-10-CM

## 2018-07-06 LAB
ANION GAP SERPL CALC-SCNC: 7 MMOL/L — SIGNIFICANT CHANGE UP (ref 5–17)
APTT BLD: 30.1 SEC — SIGNIFICANT CHANGE UP (ref 27.5–37.4)
BUN SERPL-MCNC: 15 MG/DL — SIGNIFICANT CHANGE UP (ref 7–23)
CALCIUM SERPL-MCNC: 8.1 MG/DL — LOW (ref 8.5–10.1)
CHLORIDE SERPL-SCNC: 107 MMOL/L — SIGNIFICANT CHANGE UP (ref 96–108)
CO2 SERPL-SCNC: 28 MMOL/L — SIGNIFICANT CHANGE UP (ref 22–31)
CREAT SERPL-MCNC: 0.74 MG/DL — SIGNIFICANT CHANGE UP (ref 0.5–1.3)
GLUCOSE BLDC GLUCOMTR-MCNC: 197 MG/DL — HIGH (ref 70–99)
GLUCOSE BLDC GLUCOMTR-MCNC: 237 MG/DL — HIGH (ref 70–99)
GLUCOSE SERPL-MCNC: 144 MG/DL — HIGH (ref 70–99)
HCT VFR BLD CALC: 31.6 % — LOW (ref 39–50)
HGB BLD-MCNC: 10.9 G/DL — LOW (ref 13–17)
INR BLD: 1.07 RATIO — SIGNIFICANT CHANGE UP (ref 0.88–1.16)
MCHC RBC-ENTMCNC: 32.1 PG — SIGNIFICANT CHANGE UP (ref 27–34)
MCHC RBC-ENTMCNC: 34.5 GM/DL — SIGNIFICANT CHANGE UP (ref 32–36)
MCV RBC AUTO: 92.9 FL — SIGNIFICANT CHANGE UP (ref 80–100)
NRBC # BLD: 0 /100 WBCS — SIGNIFICANT CHANGE UP (ref 0–0)
PLATELET # BLD AUTO: 167 K/UL — SIGNIFICANT CHANGE UP (ref 150–400)
POTASSIUM SERPL-MCNC: 4.5 MMOL/L — SIGNIFICANT CHANGE UP (ref 3.5–5.3)
POTASSIUM SERPL-SCNC: 4.5 MMOL/L — SIGNIFICANT CHANGE UP (ref 3.5–5.3)
PROTHROM AB SERPL-ACNC: 11.6 SEC — SIGNIFICANT CHANGE UP (ref 9.8–12.7)
RBC # BLD: 3.4 M/UL — LOW (ref 4.2–5.8)
RBC # FLD: 12.8 % — SIGNIFICANT CHANGE UP (ref 10.3–14.5)
SODIUM SERPL-SCNC: 142 MMOL/L — SIGNIFICANT CHANGE UP (ref 135–145)
WBC # BLD: 7.39 K/UL — SIGNIFICANT CHANGE UP (ref 3.8–10.5)
WBC # FLD AUTO: 7.39 K/UL — SIGNIFICANT CHANGE UP (ref 3.8–10.5)

## 2018-07-06 PROCEDURE — 73501 X-RAY EXAM HIP UNI 1 VIEW: CPT | Mod: 26

## 2018-07-06 PROCEDURE — 88305 TISSUE EXAM BY PATHOLOGIST: CPT | Mod: 26

## 2018-07-06 PROCEDURE — 27130 TOTAL HIP ARTHROPLASTY: CPT | Mod: AS,RT

## 2018-07-06 PROCEDURE — 99223 1ST HOSP IP/OBS HIGH 75: CPT

## 2018-07-06 RX ORDER — FENTANYL CITRATE 50 UG/ML
50 INJECTION INTRAVENOUS
Qty: 0 | Refills: 0 | Status: DISCONTINUED | OUTPATIENT
Start: 2018-07-06 | End: 2018-07-06

## 2018-07-06 RX ORDER — WARFARIN SODIUM 2.5 MG/1
1 TABLET ORAL
Qty: 0 | Refills: 0 | COMMUNITY
Start: 2018-07-06

## 2018-07-06 RX ORDER — ACETAMINOPHEN 500 MG
650 TABLET ORAL EVERY 6 HOURS
Qty: 0 | Refills: 0 | Status: DISCONTINUED | OUTPATIENT
Start: 2018-07-06 | End: 2018-07-10

## 2018-07-06 RX ORDER — FOLIC ACID 0.8 MG
1 TABLET ORAL DAILY
Qty: 0 | Refills: 0 | Status: DISCONTINUED | OUTPATIENT
Start: 2018-07-06 | End: 2018-07-10

## 2018-07-06 RX ORDER — DOCUSATE SODIUM 100 MG
100 CAPSULE ORAL THREE TIMES A DAY
Qty: 0 | Refills: 0 | Status: DISCONTINUED | OUTPATIENT
Start: 2018-07-06 | End: 2018-07-10

## 2018-07-06 RX ORDER — ATORVASTATIN CALCIUM 80 MG/1
20 TABLET, FILM COATED ORAL AT BEDTIME
Qty: 0 | Refills: 0 | Status: DISCONTINUED | OUTPATIENT
Start: 2018-07-06 | End: 2018-07-10

## 2018-07-06 RX ORDER — GLUCAGON INJECTION, SOLUTION 0.5 MG/.1ML
1 INJECTION, SOLUTION SUBCUTANEOUS ONCE
Qty: 0 | Refills: 0 | Status: DISCONTINUED | OUTPATIENT
Start: 2018-07-06 | End: 2018-07-10

## 2018-07-06 RX ORDER — WARFARIN SODIUM 2.5 MG/1
5 TABLET ORAL DAILY
Qty: 0 | Refills: 0 | Status: COMPLETED | OUTPATIENT
Start: 2018-07-06 | End: 2018-07-08

## 2018-07-06 RX ORDER — TAMSULOSIN HYDROCHLORIDE 0.4 MG/1
0.4 CAPSULE ORAL AT BEDTIME
Qty: 0 | Refills: 0 | Status: DISCONTINUED | OUTPATIENT
Start: 2018-07-06 | End: 2018-07-10

## 2018-07-06 RX ORDER — DOCUSATE SODIUM 100 MG
1 CAPSULE ORAL
Qty: 14 | Refills: 0
Start: 2018-07-06 | End: 2018-07-12

## 2018-07-06 RX ORDER — ONDANSETRON 8 MG/1
4 TABLET, FILM COATED ORAL EVERY 6 HOURS
Qty: 0 | Refills: 0 | Status: DISCONTINUED | OUTPATIENT
Start: 2018-07-06 | End: 2018-07-10

## 2018-07-06 RX ORDER — DEXTROSE 50 % IN WATER 50 %
25 SYRINGE (ML) INTRAVENOUS ONCE
Qty: 0 | Refills: 0 | Status: DISCONTINUED | OUTPATIENT
Start: 2018-07-06 | End: 2018-07-10

## 2018-07-06 RX ORDER — HEPARIN SODIUM 5000 [USP'U]/ML
5000 INJECTION INTRAVENOUS; SUBCUTANEOUS EVERY 8 HOURS
Qty: 0 | Refills: 0 | Status: COMPLETED | OUTPATIENT
Start: 2018-07-06 | End: 2018-07-07

## 2018-07-06 RX ORDER — OMEGA-3 ACID ETHYL ESTERS 1 G
2 CAPSULE ORAL
Qty: 0 | Refills: 0 | Status: DISCONTINUED | OUTPATIENT
Start: 2018-07-06 | End: 2018-07-10

## 2018-07-06 RX ORDER — DIPHENHYDRAMINE HCL 50 MG
25 CAPSULE ORAL AT BEDTIME
Qty: 0 | Refills: 0 | Status: DISCONTINUED | OUTPATIENT
Start: 2018-07-06 | End: 2018-07-10

## 2018-07-06 RX ORDER — PANTOPRAZOLE SODIUM 20 MG/1
40 TABLET, DELAYED RELEASE ORAL DAILY
Qty: 0 | Refills: 0 | Status: DISCONTINUED | OUTPATIENT
Start: 2018-07-06 | End: 2018-07-10

## 2018-07-06 RX ORDER — SODIUM CHLORIDE 9 MG/ML
1000 INJECTION, SOLUTION INTRAVENOUS
Qty: 0 | Refills: 0 | Status: DISCONTINUED | OUTPATIENT
Start: 2018-07-06 | End: 2018-07-10

## 2018-07-06 RX ORDER — PANTOPRAZOLE SODIUM 20 MG/1
1 TABLET, DELAYED RELEASE ORAL
Qty: 14 | Refills: 0
Start: 2018-07-06 | End: 2018-07-19

## 2018-07-06 RX ORDER — DEXTROSE 50 % IN WATER 50 %
12.5 SYRINGE (ML) INTRAVENOUS ONCE
Qty: 0 | Refills: 0 | Status: DISCONTINUED | OUTPATIENT
Start: 2018-07-06 | End: 2018-07-10

## 2018-07-06 RX ORDER — POLYETHYLENE GLYCOL 3350 17 G/17G
17 POWDER, FOR SOLUTION ORAL DAILY
Qty: 0 | Refills: 0 | Status: DISCONTINUED | OUTPATIENT
Start: 2018-07-06 | End: 2018-07-08

## 2018-07-06 RX ORDER — INSULIN LISPRO 100/ML
VIAL (ML) SUBCUTANEOUS
Qty: 0 | Refills: 0 | Status: DISCONTINUED | OUTPATIENT
Start: 2018-07-06 | End: 2018-07-10

## 2018-07-06 RX ORDER — SENNA PLUS 8.6 MG/1
2 TABLET ORAL AT BEDTIME
Qty: 0 | Refills: 0 | Status: DISCONTINUED | OUTPATIENT
Start: 2018-07-06 | End: 2018-07-10

## 2018-07-06 RX ORDER — BENZOCAINE AND MENTHOL 5; 1 G/100ML; G/100ML
1 LIQUID ORAL
Qty: 0 | Refills: 0 | Status: DISCONTINUED | OUTPATIENT
Start: 2018-07-06 | End: 2018-07-10

## 2018-07-06 RX ORDER — CEFAZOLIN SODIUM 1 G
2000 VIAL (EA) INJECTION EVERY 8 HOURS
Qty: 0 | Refills: 0 | Status: COMPLETED | OUTPATIENT
Start: 2018-07-06 | End: 2018-07-06

## 2018-07-06 RX ORDER — INSULIN LISPRO 100/ML
VIAL (ML) SUBCUTANEOUS AT BEDTIME
Qty: 0 | Refills: 0 | Status: DISCONTINUED | OUTPATIENT
Start: 2018-07-06 | End: 2018-07-10

## 2018-07-06 RX ORDER — SODIUM CHLORIDE 9 MG/ML
500 INJECTION, SOLUTION INTRAVENOUS ONCE
Qty: 0 | Refills: 0 | Status: COMPLETED | OUTPATIENT
Start: 2018-07-06 | End: 2018-07-06

## 2018-07-06 RX ORDER — DIPHENHYDRAMINE HCL 50 MG
25 CAPSULE ORAL EVERY 6 HOURS
Qty: 0 | Refills: 0 | Status: DISCONTINUED | OUTPATIENT
Start: 2018-07-06 | End: 2018-07-10

## 2018-07-06 RX ORDER — ONDANSETRON 8 MG/1
4 TABLET, FILM COATED ORAL ONCE
Qty: 0 | Refills: 0 | Status: DISCONTINUED | OUTPATIENT
Start: 2018-07-06 | End: 2018-07-06

## 2018-07-06 RX ORDER — SODIUM CHLORIDE 9 MG/ML
1000 INJECTION, SOLUTION INTRAVENOUS
Qty: 0 | Refills: 0 | Status: DISCONTINUED | OUTPATIENT
Start: 2018-07-06 | End: 2018-07-07

## 2018-07-06 RX ORDER — SODIUM CHLORIDE 9 MG/ML
1000 INJECTION, SOLUTION INTRAVENOUS
Qty: 0 | Refills: 0 | Status: DISCONTINUED | OUTPATIENT
Start: 2018-07-06 | End: 2018-07-06

## 2018-07-06 RX ORDER — ASCORBIC ACID 60 MG
500 TABLET,CHEWABLE ORAL
Qty: 0 | Refills: 0 | Status: DISCONTINUED | OUTPATIENT
Start: 2018-07-06 | End: 2018-07-10

## 2018-07-06 RX ORDER — DEXTROSE 50 % IN WATER 50 %
15 SYRINGE (ML) INTRAVENOUS ONCE
Qty: 0 | Refills: 0 | Status: DISCONTINUED | OUTPATIENT
Start: 2018-07-06 | End: 2018-07-10

## 2018-07-06 RX ORDER — BETHANECHOL CHLORIDE 25 MG
25 TABLET ORAL EVERY 8 HOURS
Qty: 0 | Refills: 0 | Status: DISCONTINUED | OUTPATIENT
Start: 2018-07-06 | End: 2018-07-10

## 2018-07-06 RX ORDER — FINASTERIDE 5 MG/1
5 TABLET, FILM COATED ORAL DAILY
Qty: 0 | Refills: 0 | Status: DISCONTINUED | OUTPATIENT
Start: 2018-07-06 | End: 2018-07-10

## 2018-07-06 RX ORDER — OXYCODONE HYDROCHLORIDE 5 MG/1
5 TABLET ORAL EVERY 4 HOURS
Qty: 0 | Refills: 0 | Status: DISCONTINUED | OUTPATIENT
Start: 2018-07-06 | End: 2018-07-06

## 2018-07-06 RX ADMIN — ATORVASTATIN CALCIUM 20 MILLIGRAM(S): 80 TABLET, FILM COATED ORAL at 21:26

## 2018-07-06 RX ADMIN — TAMSULOSIN HYDROCHLORIDE 0.4 MILLIGRAM(S): 0.4 CAPSULE ORAL at 21:26

## 2018-07-06 RX ADMIN — FINASTERIDE 5 MILLIGRAM(S): 5 TABLET, FILM COATED ORAL at 16:40

## 2018-07-06 RX ADMIN — Medication 975 MILLIGRAM(S): at 08:12

## 2018-07-06 RX ADMIN — Medication 650 MILLIGRAM(S): at 17:50

## 2018-07-06 RX ADMIN — FAMOTIDINE 20 MILLIGRAM(S): 10 INJECTION INTRAVENOUS at 08:12

## 2018-07-06 RX ADMIN — Medication 25 MILLIGRAM(S): at 21:26

## 2018-07-06 RX ADMIN — HEPARIN SODIUM 5000 UNIT(S): 5000 INJECTION INTRAVENOUS; SUBCUTANEOUS at 21:25

## 2018-07-06 RX ADMIN — Medication 2: at 16:44

## 2018-07-06 RX ADMIN — Medication 2 GRAM(S): at 17:50

## 2018-07-06 RX ADMIN — OXYCODONE HYDROCHLORIDE 10 MILLIGRAM(S): 5 TABLET ORAL at 08:12

## 2018-07-06 RX ADMIN — Medication 100 MILLIGRAM(S): at 21:27

## 2018-07-06 RX ADMIN — OXYCODONE HYDROCHLORIDE 10 MILLIGRAM(S): 5 TABLET ORAL at 17:52

## 2018-07-06 RX ADMIN — SODIUM CHLORIDE 1000 MILLILITER(S): 9 INJECTION, SOLUTION INTRAVENOUS at 12:16

## 2018-07-06 RX ADMIN — Medication 650 MILLIGRAM(S): at 18:43

## 2018-07-06 RX ADMIN — PANTOPRAZOLE SODIUM 40 MILLIGRAM(S): 20 TABLET, DELAYED RELEASE ORAL at 08:12

## 2018-07-06 RX ADMIN — OXYCODONE HYDROCHLORIDE 10 MILLIGRAM(S): 5 TABLET ORAL at 21:10

## 2018-07-06 RX ADMIN — Medication 975 MILLIGRAM(S): at 08:13

## 2018-07-06 RX ADMIN — HYDROMORPHONE HYDROCHLORIDE 0.5 MILLIGRAM(S): 2 INJECTION INTRAMUSCULAR; INTRAVENOUS; SUBCUTANEOUS at 21:23

## 2018-07-06 RX ADMIN — HYDROMORPHONE HYDROCHLORIDE 0.5 MILLIGRAM(S): 2 INJECTION INTRAMUSCULAR; INTRAVENOUS; SUBCUTANEOUS at 21:42

## 2018-07-06 RX ADMIN — OXYCODONE HYDROCHLORIDE 10 MILLIGRAM(S): 5 TABLET ORAL at 15:56

## 2018-07-06 RX ADMIN — Medication 100 MILLIGRAM(S): at 17:50

## 2018-07-06 RX ADMIN — WARFARIN SODIUM 5 MILLIGRAM(S): 2.5 TABLET ORAL at 21:27

## 2018-07-06 RX ADMIN — OXYCODONE HYDROCHLORIDE 10 MILLIGRAM(S): 5 TABLET ORAL at 20:20

## 2018-07-06 RX ADMIN — OXYCODONE HYDROCHLORIDE 10 MILLIGRAM(S): 5 TABLET ORAL at 08:13

## 2018-07-06 NOTE — DISCHARGE NOTE ADULT - HOSPITAL COURSE
H&P:  Pt is a 73y Male  PAST MEDICAL & SURGICAL HISTORY:  Cataract of both eyes, unspecified cataract type  Spinal stenosis of lumbar region, unspecified whether neurogenic claudication present  Primary osteoarthritis of left knee: history of. knee replacement  Pain of both hip joints  Primary osteoarthritis of right hip  Urinary frequency  History of colon polyps  Tinnitus of both ears  CAD (coronary artery disease)  Hyperlipidemia  KENDRA (obstructive sleep apnea): does not use CPAP  BPH (benign prostatic hypertrophy)  Psoriasis  Sleep apnea: does not use device  Diabetes  H/O total knee replacement, left: 2016  CAD (coronary artery disease): cardiac cath 7/9/15 negative  Encounter for screening colonoscopy  S/P left knee arthroscopy       Now s/p Total Hip Arthroplasty. Pt is afebrile with stable vital signs. Pain is controlled. Alert and Oriented. Exam reveals intact EHL FHL TA GS, +DP. Dressing is clean and dry with a New Aquacel bandage on.    VITALS**  LABS**      Hospital Course:  Patient presented to [facility] medically cleared for elective Hip Replacement Surgery, having failed outpatient conservative management. Prophylactic antibiotics were started before the procedure and continued for 24 hours. They were admitted after surgery to the orthopedic floor.   There were no complications during the hospital stay. All home medications were continued.    Routine consults were obtained from the Anticoagulation Team for DVT/PE prophylaxis, from Physical Therapy for twice daily PT, and from the Hospitalist for Medical Co-management. Patient was placed on  anticoagulation.  Pertinent home medications were continued.  Daily labs were followed.      POD 0 pt was stable overnight.  POD1 the hemovac drain (if used) was removed. Pt received PT twice daily, and a new Aquacel dressing was applied prior to discharge. The plan is for for DC to home with home PT.  The orthopedic Attending is aware and agrees. H&P:  Pt is a 73y Male  PAST MEDICAL & SURGICAL HISTORY:  Cataract of both eyes, unspecified cataract type  Spinal stenosis of lumbar region, unspecified whether neurogenic claudication present  Primary osteoarthritis of left knee: history of. knee replacement  Pain of both hip joints  Primary osteoarthritis of right hip  Urinary frequency  History of colon polyps  Tinnitus of both ears  CAD (coronary artery disease)  Hyperlipidemia  KENDRA (obstructive sleep apnea): does not use CPAP  BPH (benign prostatic hypertrophy)  Psoriasis  Sleep apnea: does not use device  Diabetes  H/O total knee replacement, left: 2016  CAD (coronary artery disease): cardiac cath 7/9/15 negative  Encounter for screening colonoscopy  S/P left knee arthroscopy       Now s/p Total Hip Arthroplasty. Pt is afebrile with stable vital signs. Pain is controlled. Alert and Oriented. Exam reveals intact EHL FHL TA GS, +DP. Dressing is clean and dry with a New Aquacel bandage on.    Vital Signs Last 24 Hrs  T(C): 37.3 (07 Jul 2018 23:45), Max: 37.3 (07 Jul 2018 23:45)  T(F): 99.1 (07 Jul 2018 23:45), Max: 99.1 (07 Jul 2018 23:45)  HR: 93 (08 Jul 2018 06:21) (75 - 96)  BP: 123/57 (08 Jul 2018 06:21) (94/62 - 136/83)  BP(mean): 58 (07 Jul 2018 12:48) (58 - 58)  RR: 16 (08 Jul 2018 06:21) (16 - 16)  SpO2: 92% (08 Jul 2018 06:21) (92% - 96%)    LABS:                        10.5   9.12  )-----------( 161      ( 08 Jul 2018 06:48 )             30.1     08 Jul 2018 06:48    133    |  100    |  16     ----------------------------<  147    3.8     |  25     |  0.71     Ca    8.1        08 Jul 2018 06:48      PT/INR - ( 08 Jul 2018 06:48 )   PT: 17.9 sec;   INR: 1.64 ratio        Hospital Course:  Patient presented to [facility] medically cleared for elective Hip Replacement Surgery, having failed outpatient conservative management. Prophylactic antibiotics were started before the procedure and continued for 24 hours. They were admitted after surgery to the orthopedic floor.   There were no complications during the hospital stay. All home medications were continued.    Routine consults were obtained from the Anticoagulation Team for DVT/PE prophylaxis, from Physical Therapy for twice daily PT, and from the Hospitalist for Medical Co-management. Patient was placed on  anticoagulation.  Pertinent home medications were continued.  Daily labs were followed.      POD 0 pt was stable overnight.  POD1 the hemovac drain (if used) was removed. Pt received PT twice daily, and a new Aquacel dressing was applied prior to discharge. The plan is for for DC to home with home PT.  The orthopedic Attending is aware and agrees. H&P:  Pt is a 73y Male  PAST MEDICAL & SURGICAL HISTORY:  Cataract of both eyes, unspecified cataract type  Spinal stenosis of lumbar region, unspecified whether neurogenic claudication present  Primary osteoarthritis of left knee: history of. knee replacement  Pain of both hip joints  Primary osteoarthritis of right hip  Urinary frequency  History of colon polyps  Tinnitus of both ears  CAD (coronary artery disease)  Hyperlipidemia  KENDRA (obstructive sleep apnea): does not use CPAP  BPH (benign prostatic hypertrophy)  Psoriasis  Sleep apnea: does not use device  Diabetes  H/O total knee replacement, left: 2016  CAD (coronary artery disease): cardiac cath 7/9/15 negative  Encounter for screening colonoscopy  S/P left knee arthroscopy       Now s/p Total Hip Arthroplasty. Pt is afebrile with stable vital signs. Pain is controlled. Alert and Oriented. Exam reveals intact EHL FHL TA GS, +DP. Dressing is clean and dry with a New Aquacel bandage on.    Vital Signs Last 24 Hrs  T(C): 37.4 (09 Jul 2018 03:22), Max: 38.2 (08 Jul 2018 23:28)  T(F): 99.3 (09 Jul 2018 03:22), Max: 100.7 (08 Jul 2018 23:28)  HR: 102 (08 Jul 2018 23:28) (98 - 104)  BP: 108/66 (08 Jul 2018 23:28) (100/69 - 111/60)  RR: 16 (08 Jul 2018 23:28) (16 - 16)  SpO2: 93% (08 Jul 2018 23:28) (93% - 97%)    LABS:                        10.4   9.52  )-----------( 188      ( 09 Jul 2018 05:31 )             30.2     09 Jul 2018 05:31    137    |  101    |  15     ----------------------------<  138    3.8     |  28     |  0.66     Ca    8.1        09 Jul 2018 05:31      PT/INR - ( 09 Jul 2018 05:31 )   PT: 17.9 sec;   INR: 1.64 ratio         Hospital Course:  Patient presented to [facility] medically cleared for elective Hip Replacement Surgery, having failed outpatient conservative management. Prophylactic antibiotics were started before the procedure and continued for 24 hours. They were admitted after surgery to the orthopedic floor.   There were no complications during the hospital stay. All home medications were continued.    Routine consults were obtained from the Anticoagulation Team for DVT/PE prophylaxis, from Physical Therapy for twice daily PT, and from the Hospitalist for Medical Co-management. Patient was placed on  anticoagulation.  Pertinent home medications were continued.  Daily labs were followed.      POD 0 pt was stable overnight.  POD1 the hemovac drain (if used) was removed. Pt received PT twice daily, and a new Aquacel dressing was applied prior to discharge. The plan is for for DC to home with home PT.  The orthopedic Attending is aware and agrees. H&P:  Pt is a 73y Male  PAST MEDICAL & SURGICAL HISTORY:  Cataract of both eyes, unspecified cataract type  Spinal stenosis of lumbar region, unspecified whether neurogenic claudication present  Primary osteoarthritis of left knee: history of. knee replacement  Pain of both hip joints  Primary osteoarthritis of right hip  Urinary frequency  History of colon polyps  Tinnitus of both ears  CAD (coronary artery disease)  Hyperlipidemia  KENDRA (obstructive sleep apnea): does not use CPAP  BPH (benign prostatic hypertrophy)  Psoriasis  Sleep apnea: does not use device  Diabetes  H/O total knee replacement, left: 2016  CAD (coronary artery disease): cardiac cath 7/9/15 negative  Encounter for screening colonoscopy  S/P left knee arthroscopy       Now s/p Total Hip Arthroplasty. Pt is afebrile with stable vital signs. Pain is controlled. Alert and Oriented. Exam reveals intact EHL FHL TA GS, +DP. Dressing is clean and dry with a New Aquacel bandage on.    Vital Signs Last 24 Hrs  T(C): 37.4 (09 Jul 2018 03:22), Max: 38.2 (08 Jul 2018 23:28)  T(F): 99.3 (09 Jul 2018 03:22), Max: 100.7 (08 Jul 2018 23:28)  HR: 102 (08 Jul 2018 23:28) (98 - 104)  BP: 108/66 (08 Jul 2018 23:28) (100/69 - 111/60)  RR: 16 (08 Jul 2018 23:28) (16 - 16)  SpO2: 93% (08 Jul 2018 23:28) (93% - 97%)    LABS:                        10.4   9.52  )-----------( 188      ( 09 Jul 2018 05:31 )             30.2     09 Jul 2018 05:31    137    |  101    |  15     ----------------------------<  138    3.8     |  28     |  0.66     Ca    8.1        09 Jul 2018 05:31      PT/INR - ( 09 Jul 2018 05:31 )   PT: 17.9 sec;   INR: 1.64 ratio         Hospital Course:  Patient presented to [facility] medically cleared for elective Hip Replacement Surgery, having failed outpatient conservative management. Prophylactic antibiotics were started before the procedure and continued for 24 hours. They were admitted after surgery to the orthopedic floor.   There were no complications during the hospital stay. All home medications were continued.    Routine consults were obtained from the Anticoagulation Team for DVT/PE prophylaxis, from Physical Therapy for twice daily PT, and from the Hospitalist for Medical Co-management. Patient was placed on  anticoagulation.  Pertinent home medications were continued.  Daily labs were followed.      POD 0 pt was stable overnight. Pt received PT twice daily, and a new Aquacel dressing was applied prior to discharge. The plan is for for DC to rehab for ongoing PT.  The orthopedic Attending is aware and agrees. H&P:  Pt is a 73y Male  PAST MEDICAL & SURGICAL HISTORY:  Cataract of both eyes, unspecified cataract type  Spinal stenosis of lumbar region, unspecified whether neurogenic claudication present  Primary osteoarthritis of left knee: history of. knee replacement  Pain of both hip joints  Primary osteoarthritis of right hip  Urinary frequency  History of colon polyps  Tinnitus of both ears  CAD (coronary artery disease)  Hyperlipidemia  KENDRA (obstructive sleep apnea): does not use CPAP  BPH (benign prostatic hypertrophy)  Psoriasis  Sleep apnea: does not use device  Diabetes  H/O total knee replacement, left: 2016  CAD (coronary artery disease): cardiac cath 7/9/15 negative  Encounter for screening colonoscopy  S/P left knee arthroscopy       Now s/p RIGHT Total Hip Arthroplasty. Pt is afebrile with stable vital signs. Pain is controlled. Alert and Oriented. Exam reveals intact EHL FHL TA GS, +DP. Dressing is clean and dry with a New Aquacel bandage on.    Vital Signs Last 24 Hrs  T(C): 36.9 (10 Jul 2018 05:36), Max: 37.6 (10 Jul 2018 00:09)  T(F): 98.4 (10 Jul 2018 05:36), Max: 99.7 (10 Jul 2018 00:09)  HR: 87 (10 Jul 2018 05:36) (87 - 107)  BP: 135/69 (10 Jul 2018 05:36) (106/56 - 135/69)  BP(mean): 75 (09 Jul 2018 11:41) (75 - 75)  RR: 16 (10 Jul 2018 05:36) (15 - 16)  SpO2: 98% (10 Jul 2018 05:36) (95% - 99%)    LABS:                        10.1   8.58  )-----------( 221      ( 10 Jul 2018 06:07 )             29.5   PT/INR - ( 10 Jul 2018 06:07 )   PT: 18.3 sec;   INR: 1.68 ratio                                 10.4   9.52  )-----------( 188      ( 09 Jul 2018 05:31 )             30.2          Hospital Course:  Patient presented to [facility] medically cleared for elective Hip Replacement Surgery, having failed outpatient conservative management. Prophylactic antibiotics were started before the procedure and continued for 24 hours. They were admitted after surgery to the orthopedic floor.   There were no complications during the hospital stay. All home medications were continued. He received a Flanagan for urinary retention on POD 1 which was managed my Medicine and Dr. oHlt was notified before DC.    Routine consults were obtained from the Anticoagulation Team for DVT/PE prophylaxis, from Physical Therapy for twice daily PT, and from the Hospitalist for Medical Co-management. Patient was placed on  anticoagulation.  Pertinent home medications were continued.  Daily labs were followed.      POD 0 pt was stable overnight. Pt received PT twice daily, and a new Aquacel dressing was applied prior to discharge. The plan is for for DC to rehab for ongoing PT.  The orthopedic Attending is aware and agrees.

## 2018-07-06 NOTE — CONSULT NOTE ADULT - ASSESSMENT
This is a 73 year old male s/p right total hip replacement with high risk for VTE due to age, immobility, BMI, surgery; low bleeding risk.    Discussed the necessity of VTE prophylaxis with coumadin. Educated patient on INR, value, meaning, and effects medications and foods may have on it. Will further reinforce coumadin education and follow up on outpatient basis.     Plan:  ::Coumadin 5 mg PO daily x 4 weeks total adjust dose per INR  ::Heparin 5,000 units SQ Q8hour until INR 1.8 or > x two days  ::Protonix 40 mg PO daily  ::Daily PT/INR  ::Daily CBC/BMP  ::Enc ambulation  ::Venevees    Thank you for this consult, will continue to follow.

## 2018-07-06 NOTE — PATIENT PROFILE ADULT. - TEACHING/LEARNING LEARNING PREFERENCES
group instruction/individual instruction/pictorial/verbal instruction/audio/computer/internet/skill demonstration/written material/video

## 2018-07-06 NOTE — DISCHARGE NOTE ADULT - PATIENT PORTAL LINK FT
You can access the [facility], offered by [facility], by registering with the following website: [email or web address]

## 2018-07-06 NOTE — BRIEF OPERATIVE NOTE - PROCEDURE
<<-----Click on this checkbox to enter Procedure Total hip arthroplasty  07/06/2018  RIGHT  Active  [id number]

## 2018-07-06 NOTE — DISCHARGE NOTE ADULT - MEDICATION SUMMARY - MEDICATIONS TO STOP TAKING
I will STOP taking the medications listed below when I get home from the hospital:    acetaminophen 325 mg oral tablet  -- 2 tab(s) by mouth every 6 hours, As needed, For Temp over 37.9 C (100.2 F)    acetaminophen-oxyCODONE 325 mg-5 mg oral tablet  -- 1 tab(s) by mouth every 4 hours, As needed, Moderate Pain

## 2018-07-06 NOTE — PHYSICAL THERAPY INITIAL EVALUATION ADULT - CRITERIA FOR SKILLED THERAPEUTIC INTERVENTIONS
anticipated discharge recommendation/therapy frequency/predicted duration of therapy intervention/anticipated equipment needs at discharge/impairments found/rehab potential/risk reduction/prevention/functional limitations in following categories

## 2018-07-06 NOTE — DISCHARGE NOTE ADULT - PLAN OF CARE
less pain and improved function Discharge Instructions Total Hip Arthroplasty    1. Diet: Resume previous diet  2. Activity: WBAT. Rolling walker. Posterior Hip Dislocation Precautions. Abduction Pillow while in bed for 6 weeks. Daily Physical Therapy.  3. Call with: fever over 101, wound redness, drainage or open area, calf pain/calf swelling.  4. Wound Care: Remove old and Place new Aquacel bandage to hip wound every 7days. No bandage needed after staple removal.  5. RN to Remove Staples Post Op Day #14 (7/20/18) so long as wound is healed, no drainage or open area.   6. OK to Shower with Aquacel. Must be an Aquacel. Avoid direct water beating on bandage.   7. DVT PE Prophylaxis: Managed by Anticoag Team. See Anticoagulation Instructions. See Med Rec.  8.  Continue Protonix daily while on Anticoagulant. An eRx has been sent to your pharmacy.  9. Labs: Check H&H weekly while on Anticoagulation. Check INR if on Coumadin.  10.  Follow Up: Dr. Quintero in 21 days (1 week after staples removed);  Call to schedule.   11. Pain Medication: eRX sent to your pharmacy for  if you go home. spontaneous complete voiding ELLEN Holt. Call for voiding trial in 1-2 days [phone] ELLEN Holt. Perform voiding trial at rehab in 1 days and please call Dr. Holt with result [phone]. Spoke with Dr. Holt about this.

## 2018-07-06 NOTE — CONSULT NOTE ADULT - SUBJECTIVE AND OBJECTIVE BOX
PCP- DR Smart    CC- s/p RT THR    HPI:  72yo/M with PMH non-obstructive CAD, BPH, Diabetes, hyperlipidemia, psoriasis, OA with prior LT TKR presented for elective RT THR. Patient has had long-standing OA affecting his ambulation, failed outpatient medical treatment and required surgery. Medical consult called for postop medical management    PMH- as above  PSH- LT THR  Soc hx- denies smoking, alcohol-socially  Fam hx- non-contributory    7/6/18- c/o lower abd pressure and inability to urinate    Review of system- All 10 systems reviewed and is as per HPI otherwise negative.     T(C): 36.3 (07-06-18 @ 16:06), Max: 36.7 (07-06-18 @ 07:35)  HR: 93 (07-06-18 @ 16:06) (65 - 93)  BP: 105/72 (07-06-18 @ 16:06) (82/47 - 124/77)  RR: 18 (07-06-18 @ 16:06) (13 - 21)  SpO2: 99% (07-06-18 @ 16:06) (94% - 99%)  Wt(kg): --    LABS:                        10.9   7.39  )-----------( 167      ( 06 Jul 2018 11:57 )             31.6     07-06    142  |  107  |  15  ----------------------------<  144<H>  4.5   |  28  |  0.74    Ca    8.1<L>      06 Jul 2018 11:57    PT/INR - ( 06 Jul 2018 07:38 )   PT: 11.6 sec;   INR: 1.07 ratio      PTT - ( 06 Jul 2018 07:38 )  PTT:30.1 sec    RADIOLOGY & ADDITIONAL TESTS:      PHYSICAL EXAM:  GENERAL: NAD, well-groomed, well-developed  HEAD:  Atraumatic, Normocephalic  EYES: EOMI, PERRLA, conjunctiva and sclera clear  HEENT: Moist mucous membranes  NECK: Supple, No JVD  NERVOUS SYSTEM:  Alert & Oriented X3, Motor Strength 5/5 B/L upper and lower extremities; DTRs 2+ intact and symmetric  CHEST/LUNG: Clear to auscultation bilaterally; No rales, rhonchi, wheezing, or rubs  HEART: Regular rate and rhythm; No murmurs, rubs, or gallops  ABDOMEN: Soft, Nontender, Nondistended; Bowel sounds present  GENITOURINARY- +distended bladder  EXTREMITIES:  2+ Peripheral Pulses, No clubbing, cyanosis, or edema  MUSCULOSKELTAL- RT hip dressing dry  SKIN-no rash, no lesion  CNS- alert, oriented X3, non focal     Daily Height in cm: 182.88 (06 Jul 2018 07:35)      acetaminophen   Tablet 650 milliGRAM(s) Oral every 6 hours PRN  acetaminophen   Tablet. 650 milliGRAM(s) Oral every 6 hours PRN  acetaminophen   Tablet. 650 milliGRAM(s) Oral every 6 hours  ascorbic acid 500 milliGRAM(s) Oral two times a day  atorvastatin 20 milliGRAM(s) Oral at bedtime  benzocaine 15 mG/menthol 3.6 mG Lozenge 1 Lozenge Oral every 3 hours PRN  ceFAZolin   IVPB 2000 milliGRAM(s) IV Intermittent every 8 hours  dextrose 40% Gel 15 Gram(s) Oral once PRN  dextrose 5%. 1000 milliLiter(s) IV Continuous <Continuous>  dextrose 50% Injectable 12.5 Gram(s) IV Push once  dextrose 50% Injectable 25 Gram(s) IV Push once  dextrose 50% Injectable 25 Gram(s) IV Push once  diphenhydrAMINE   Capsule 25 milliGRAM(s) Oral at bedtime PRN  diphenhydrAMINE   Capsule 25 milliGRAM(s) Oral every 6 hours PRN  docusate sodium 100 milliGRAM(s) Oral three times a day  finasteride 5 milliGRAM(s) Oral daily  folic acid 1 milliGRAM(s) Oral daily  glucagon  Injectable 1 milliGRAM(s) IntraMuscular once PRN  heparin  Injectable 5000 Unit(s) SubCutaneous every 8 hours  HYDROmorphone  Injectable 0.5 milliGRAM(s) IV Push every 3 hours PRN  insulin lispro (HumaLOG) corrective regimen sliding scale   SubCutaneous three times a day before meals  insulin lispro (HumaLOG) corrective regimen sliding scale   SubCutaneous at bedtime  lactated ringers. 1000 milliLiter(s) IV Continuous <Continuous>  multivitamin 1 Tablet(s) Oral daily  omega-3-Acid Ethyl Esters 2 Gram(s) Oral two times a day  ondansetron Injectable 4 milliGRAM(s) IV Push every 6 hours PRN  oxyCODONE    IR 5 milliGRAM(s) Oral every 4 hours PRN  oxyCODONE    IR 10 milliGRAM(s) Oral every 4 hours PRN  oxyCODONE  ER Tablet 10 milliGRAM(s) Oral every 12 hours  pantoprazole    Tablet 40 milliGRAM(s) Oral daily  polyethylene glycol 3350 17 Gram(s) Oral daily  senna 2 Tablet(s) Oral at bedtime PRN  sodium chloride 0.9% lock flush 3 milliLiter(s) IV Push every 8 hours  tamsulosin 0.4 milliGRAM(s) Oral at bedtime  warfarin 5 milliGRAM(s) Oral daily    Assessment/Plan  #S/p RT THR  Ortho f/u appreciated  PT as tolerated  Monitor HH  AC by Coumadin  Pain meds prn  Bowel regimen  Incentive spirometry    #Urinary retention likely 2 to BPH  Already on Flomax and Proscar  Add Bethenechol  Straight cath prn for bladder scan >350cc    #Diabetes- ISS    #Dispo- thank you for consult, will follow with you
HPI:    Patient is a 73y old  Male who presents with a chief complaint of "I have pain to my right hip." (06 Jul 2018 07:44)    Consulted by Dr. Quintero for VTE prophylaxis, risk stratification, and anticoagulation management.    PAST MEDICAL & SURGICAL HISTORY:  Cataract of both eyes, unspecified cataract type  Spinal stenosis of lumbar region, unspecified whether neurogenic claudication present  Primary osteoarthritis of left knee: history of. knee replacement  Pain of both hip joints  Primary osteoarthritis of right hip  Urinary frequency  History of colon polyps  Tinnitus of both ears  CAD (coronary artery disease)  Hyperlipidemia  KENDRA (obstructive sleep apnea): does not use CPAP  BPH (benign prostatic hypertrophy)  Psoriasis  Sleep apnea: does not use device  Diabetes  H/O total knee replacement, left: 2016  CAD (coronary artery disease): cardiac cath 7/9/15 negative  Encounter for screening colonoscopy  S/P left knee arthroscopy    BMI: 31.1    CrCl:     Caprini VTE Risk Score: 8 (high)    IMPROVE Bleeding Risk Score: 2.5 (low)    Falls Risk:   High (x  )  Mod (  )  Low (  )    7/6: Patient seen at bedside in PACU, explained coumadin to patient- he thinks he was on for joint replacement in past- denies any issues with bleeding/clotting.     FAMILY HISTORY:  Family history of cancer of GI tract (Father)  Family history of asthma (Mother)  Family history of arthritis (Mother)  Family history of heart failure (Mother)    Denies any personal or familial history of clotting or bleeding disorders.    Allergies    No Known Allergies    Intolerances    REVIEW OF SYSTEMS    (  )Fever	     (  )Constipation	(  )SOB				(  )Headache	(  )Dysuria  (  )Chills	     (  )Melena	(  )Dyspnea present on exertion	                    (  )Dizziness                    (  )Polyuria  (  )Nausea	     (  )Hematochezia	(  )Cough			                    (  )Syncope   	(  )Hematuria  (  )Vomiting    (  )Chest Pain	(  )Wheezing			(  )Weakness  (  )Diarrhea     (  )Palpitations	(  )Anorexia			(  )Myalgia    All other review of systems negative: Yes    PHYSICAL EXAM:    Constitutional: Appears Well    Neurological: A& O x 3    Skin: Warm    Respiratory and Thorax: normal effort; Breath sounds: normal; No rales/wheezing/rhonchi  	  Cardiovascular: S1, S2, regular, NMBR	    Gastrointestinal: BS + x 4Q, nontender	    Genitourinary:  Bladder nondistended, nontender    Musculoskeletal:   General Right:   + muscle/joint tenderness,   normal tone, no joint swelling,   ROM: limited	    General Left:   no muscle/joint tenderness,   normal tone, no joint swelling,   ROM: full    Hip:  Right: Dressing CDI    Lower extrems:   Right: no calf tenderness              negative maury's sign               + pedal pulses    Left:   no calf tenderness              negative maury's sign               + pedal pulses    PT/INR - ( 06 Jul 2018 07:38 )   PT: 11.6 sec;   INR: 1.07 ratio         PTT - ( 06 Jul 2018 07:38 )  PTT:30.1 sec				    MEDICATIONS  (STANDING):  ascorbic acid 500 milliGRAM(s) Oral two times a day  ceFAZolin   IVPB 2000 milliGRAM(s) IV Intermittent every 8 hours  docusate sodium 100 milliGRAM(s) Oral three times a day  folic acid 1 milliGRAM(s) Oral daily  heparin  Injectable 5000 Unit(s) SubCutaneous every 8 hours  lactated ringers. 1000 milliLiter(s) IV Continuous <Continuous>  multivitamin 1 Tablet(s) Oral daily  pantoprazole    Tablet 40 milliGRAM(s) Oral daily  polyethylene glycol 3350 17 Gram(s) Oral daily  warfarin 5 milliGRAM(s) Oral daily      Vital Signs Last 24 Hrs  T(C): 36.7 (06 Jul 2018 11:30), Max: 36.7 (06 Jul 2018 07:35)  T(F): 98 (06 Jul 2018 11:30), Max: 98.1 (06 Jul 2018 07:35)  HR: 90 (06 Jul 2018 11:45) (73 - 91)  BP: 100/53 (06 Jul 2018 11:40) (82/47 - 111/68)  BP(mean): --  RR: 15 (06 Jul 2018 11:45) (15 - 17)  SpO2: 96% (06 Jul 2018 11:45) (94% - 99%)    DVT Prophylaxis:  LMWH                   (  )  Heparin SQ           (x  )  Coumadin             (x  )  Xarelto                  (  )  Eliquis                   (  )  Venodynes           ( x )  Ambulation          (x  )  UFH                       (  )  Contraindicated  (  )

## 2018-07-06 NOTE — PROGRESS NOTE ADULT - ASSESSMENT
A: 73M s/p Right JOSEPHINE     p:  pain control   DVT ppx   post op abx  venodynes  incentive spirometer  therapy   WBAT RLE   posterior hip precautions   abduction pillow

## 2018-07-06 NOTE — PROGRESS NOTE ADULT - SUBJECTIVE AND OBJECTIVE BOX
pt seen at bedside , cami evans, pain controlled right hip with medication , denies N.T RLE     PE Right hip   dressing c/d/i   abduction pillow intact   compartments soft non tender  FROM ankle and toes  + EHL FHL GS TA   SILT   DP intact

## 2018-07-06 NOTE — DISCHARGE NOTE ADULT - CARE PROVIDER_API CALL
Porter Quintero), Orthopaedic Surgery  [address] [place] [address]  Phone: [phone]  Fax: [phone] Porter Quintero), Orthopaedic Surgery  [address] [place] [address]  Phone: [phone]  Fax: [phone]    Jhoan Holt), Urology  [address] [place] [address]  Phone: [phone]  Fax: [phone]

## 2018-07-06 NOTE — PHYSICAL THERAPY INITIAL EVALUATION ADULT - ACTIVE RANGE OF MOTION EXAMINATION, REHAB EVAL
bilateral upper extremity Active ROM was WFL (within functional limits)/bilateral  lower extremity Active ROM was WFL (within functional limits)/except R hip/knee flex ~10 deg

## 2018-07-06 NOTE — DISCHARGE NOTE ADULT - MEDICATION SUMMARY - MEDICATIONS TO TAKE
I will START or STAY ON the medications listed below when I get home from the hospital:    finasteride 5 mg oral tablet  -- 1 tab(s) by mouth once a day  -- Indication: For home med    oxyCODONE-acetaminophen 5 mg-325 mg oral tablet  -- 1 tab(s) by mouth every 4 hours, As Needed  -for severe pain MDD:6   -- Caution federal law prohibits the transfer of this drug to any person other  than the person for whom it was prescribed.  May cause drowsiness.  Alcohol may intensify this effect.  Use care when operating dangerous machinery.  This prescription cannot be refilled.  This product contains acetaminophen.  Do not use  with any other product containing acetaminophen to prevent possible liver damage.  Using more of this medication than prescribed may cause serious breathing problems.    -- Indication: For moderate to severe pain, do not take any tylenol with this    tamsulosin 0.4 mg oral capsule  -- 1 cap(s) by mouth once a day  -- Indication: For home med    warfarin 5 mg oral tablet  -- 1 tab(s) by mouth once a day, per AC Team  -- Indication: For blood clot prevention    metFORMIN 500 mg oral tablet, extended release  -- 2 tab(s) by mouth once a day  -- Indication: For home med    atorvastatin 20 mg oral tablet  -- 1 tab(s) by mouth once a day  -- Indication: For home med    Calcitrene  -- Apply on skin to affected area once a day (at bedtime)  -- Indication: For home med    Colace 100 mg oral capsule  -- 1 cap(s) by mouth 2 times a day MDD:2, while on narcotics  -- Medication should be taken with plenty of water.    -- Indication: For stool softener    Fish Oil oral capsule  -- 2 cap(s) by mouth once a day  -- Indication: For home med    pantoprazole 40 mg oral delayed release tablet  -- 1 tab(s) by mouth once a day, stomach protection  -- It is very important that you take or use this exactly as directed.  Do not skip doses or discontinue unless directed by your doctor.  Obtain medical advice before taking any non-prescription drugs as some may affect the action of this medication.  Swallow whole.  Do not crush.    -- Indication: For GI protection I will START or STAY ON the medications listed below when I get home from the hospital:    finasteride 5 mg oral tablet  -- 1 tab(s) by mouth once a day  -- Indication: For home med    oxyCODONE-acetaminophen 5 mg-325 mg oral tablet  -- 1 tab(s) by mouth every 4 hours, As Needed  -for severe pain MDD:6   -- Caution federal law prohibits the transfer of this drug to any person other  than the person for whom it was prescribed.  May cause drowsiness.  Alcohol may intensify this effect.  Use care when operating dangerous machinery.  This prescription cannot be refilled.  This product contains acetaminophen.  Do not use  with any other product containing acetaminophen to prevent possible liver damage.  Using more of this medication than prescribed may cause serious breathing problems.    -- Indication: For moderate to severe pain, do not take any tylenol with this    tamsulosin 0.4 mg oral capsule  -- 1 cap(s) by mouth once a day  -- Indication: For home med    warfarin 5 mg oral tablet  -- 1 tab by mouth once daily with dinner as directed by [facility] [phone]  -- Indication: For blood clot prevention    metFORMIN 500 mg oral tablet, extended release  -- 2 tab(s) by mouth once a day  -- Indication: For home med    atorvastatin 20 mg oral tablet  -- 1 tab(s) by mouth once a day  -- Indication: For home med    Calcitrene  -- Apply on skin to affected area once a day (at bedtime)  -- Indication: For home med    Colace 100 mg oral capsule  -- 1 cap(s) by mouth 2 times a day MDD:2, while on narcotics  -- Medication should be taken with plenty of water.    -- Indication: For stool softener    Fish Oil oral capsule  -- 2 cap(s) by mouth once a day  -- Indication: For home med    pantoprazole 40 mg oral delayed release tablet  -- 1 tab(s) by mouth once a day, stomach protection  -- It is very important that you take or use this exactly as directed.  Do not skip doses or discontinue unless directed by your doctor.  Obtain medical advice before taking any non-prescription drugs as some may affect the action of this medication.  Swallow whole.  Do not crush.    -- Indication: For GI protection I will START or STAY ON the medications listed below when I get home from the hospital:    finasteride 5 mg oral tablet  -- 1 tab(s) by mouth once a day  -- Indication: For home med    Oxaydo 5 mg oral tablet  -- 1 tab(s) by mouth every 4 hours, As needed, severe pain  -- Indication: For severe pain    tamsulosin 0.4 mg oral capsule  -- 1 cap(s) by mouth once a day  -- Indication: For home med    warfarin 5 mg oral tablet  -- 1 tab by mouth once daily with dinner as directed by [facility] [phone]  -- Indication: For blood clot prevention    enoxaparin  -- 40 milligram(s) subcutaneously once a day  -- Indication: For blood clot prevention until therapeutic INR    metFORMIN 500 mg oral tablet, extended release  -- 2 tab(s) by mouth once a day  -- Indication: For home med    atorvastatin 20 mg oral tablet  -- 1 tab(s) by mouth once a day  -- Indication: For home med    Calcitrene  -- Apply on skin to affected area once a day (at bedtime)  -- Indication: For home med    Colace 100 mg oral capsule  -- 1 cap(s) by mouth 2 times a day MDD:2, while on narcotics  -- Medication should be taken with plenty of water.    -- Indication: For stool softener    Fish Oil oral capsule  -- 2 cap(s) by mouth once a day  -- Indication: For home med    pantoprazole 40 mg oral delayed release tablet  -- 1 tab(s) by mouth once a day, stomach protection  -- It is very important that you take or use this exactly as directed.  Do not skip doses or discontinue unless directed by your doctor.  Obtain medical advice before taking any non-prescription drugs as some may affect the action of this medication.  Swallow whole.  Do not crush.    -- Indication: For GI protection I will START or STAY ON the medications listed below when I get home from the hospital:    finasteride 5 mg oral tablet  -- 1 tab(s) by mouth once a day  -- Indication: For home med    Oxaydo 5 mg oral tablet  -- 1 tab(s) by mouth every 4 hours, As needed, severe pain  -- Indication: For severe pain    tamsulosin 0.4 mg oral capsule  -- 1 cap(s) by mouth once a day  -- Indication: For home med    warfarin 5 mg oral tablet  -- 1 tab by mouth once daily with dinner as directed by [facility] [phone]  -- Indication: For blood clot prevention    enoxaparin  -- 40 milligram(s) subcutaneously once a day  -- Indication: For blood clot prevention until therapeutic INR    metFORMIN 500 mg oral tablet, extended release  -- 2 tab(s) by mouth once a day  -- Indication: For home med    atorvastatin 20 mg oral tablet  -- 1 tab(s) by mouth once a day  -- Indication: For home med    Calcitrene  -- Apply on skin to affected area once a day (at bedtime)  -- Indication: For home med    Colace 100 mg oral capsule  -- 1 cap(s) by mouth 2 times a day MDD:2, while on narcotics  -- Medication should be taken with plenty of water.    -- Indication: For stool softener    Fish Oil oral capsule  -- 2 cap(s) by mouth once a day  -- Indication: For home med    pantoprazole 40 mg oral delayed release tablet  -- 1 tab(s) by mouth once a day, stomach protection  -- It is very important that you take or use this exactly as directed.  Do not skip doses or discontinue unless directed by your doctor.  Obtain medical advice before taking any non-prescription drugs as some may affect the action of this medication.  Swallow whole.  Do not crush.    -- Indication: For GI protection I will START or STAY ON the medications listed below when I get home from the hospital:    finasteride 5 mg oral tablet  -- 1 tab(s) by mouth once a day  -- Indication: For home med    Oxaydo 5 mg oral tablet  -- 1 tab(s) by mouth every 4 hours, As needed, severe pain  -- Indication: For severe pain    tamsulosin 0.4 mg oral capsule  -- 1 cap(s) by mouth once a day  -- Indication: For home med    warfarin 5 mg oral tablet  -- 1 tab by mouth once daily with dinner as directed by [facility] [phone]  -- Indication: For blood clot prevention    enoxaparin  -- 40 milligram(s) subcutaneously once a day  -- Indication: For blood clot prevention until therapeutic INR 1.8 or higher    metFORMIN 500 mg oral tablet, extended release  -- 2 tab(s) by mouth once a day  -- Indication: For home med    atorvastatin 20 mg oral tablet  -- 1 tab(s) by mouth once a day  -- Indication: For home med    Calcitrene  -- Apply on skin to affected area once a day (at bedtime)  -- Indication: For home med    Colace 100 mg oral capsule  -- 1 cap(s) by mouth 2 times a day MDD:2, while on narcotics  -- Medication should be taken with plenty of water.    -- Indication: For stool softener    Fish Oil oral capsule  -- 2 cap(s) by mouth once a day  -- Indication: For home med    pantoprazole 40 mg oral delayed release tablet  -- 1 tab(s) by mouth once a day, stomach protection  -- It is very important that you take or use this exactly as directed.  Do not skip doses or discontinue unless directed by your doctor.  Obtain medical advice before taking any non-prescription drugs as some may affect the action of this medication.  Swallow whole.  Do not crush.    -- Indication: For GI protection I will START or STAY ON the medications listed below when I get home from the hospital:    finasteride 5 mg oral tablet  -- 1 tab(s) by mouth once a day  -- Indication: For home med    Oxaydo 5 mg oral tablet  -- 1 tab(s) by mouth every 4 hours, As needed, severe pain  -- Indication: For severe pain    tamsulosin 0.4 mg oral capsule  -- 1 cap(s) by mouth once a day  -- Indication: For home med    warfarin 5 mg oral tablet  -- 1 tab by mouth once daily with dinner as directed by [facility] [phone]  -- Indication: For blood clot prevention    enoxaparin  -- 40 milligram(s) subcutaneously once a day  -- Indication: For blood clot prevention until therapeutic INR 1.8 or higher    metFORMIN 500 mg oral tablet, extended release  -- 2 tab(s) by mouth once a day  -- Indication: For home med    atorvastatin 20 mg oral tablet  -- 1 tab(s) by mouth once a day  -- Indication: For home med    Calcitrene  -- Apply on skin to affected area once a day (at bedtime)  -- Indication: For home med    Colace 100 mg oral capsule  -- 1 cap(s) by mouth 2 times a day MDD:2, while on narcotics  -- Medication should be taken with plenty of water.    -- Indication: For stool softener    Fish Oil oral capsule  -- 2 cap(s) by mouth once a day  -- Indication: For home med    pantoprazole 40 mg oral delayed release tablet  -- 1 tab(s) by mouth once a day, stomach protection  -- It is very important that you take or use this exactly as directed.  Do not skip doses or discontinue unless directed by your doctor.  Obtain medical advice before taking any non-prescription drugs as some may affect the action of this medication.  Swallow whole.  Do not crush.    -- Indication: For GI protection

## 2018-07-06 NOTE — DISCHARGE NOTE ADULT - CARE PLAN
Principal Discharge DX:	Primary osteoarthritis of right hip  Goal:	less pain and improved function  Assessment and plan of treatment:	Discharge Instructions Total Hip Arthroplasty    1. Diet: Resume previous diet  2. Activity: WBAT. Rolling walker. Posterior Hip Dislocation Precautions. Abduction Pillow while in bed for 6 weeks. Daily Physical Therapy.  3. Call with: fever over 101, wound redness, drainage or open area, calf pain/calf swelling.  4. Wound Care: Remove old and Place new Aquacel bandage to hip wound every 7days. No bandage needed after staple removal.  5. RN to Remove Staples Post Op Day #14 (7/20/18) so long as wound is healed, no drainage or open area.   6. OK to Shower with Aquacel. Must be an Aquacel. Avoid direct water beating on bandage.   7. DVT PE Prophylaxis: Managed by Anticoag Team. See Anticoagulation Instructions. See Med Rec.  8.  Continue Protonix daily while on Anticoagulant. An eRx has been sent to your pharmacy.  9. Labs: Check H&H weekly while on Anticoagulation. Check INR if on Coumadin.  10.  Follow Up: Dr. Quintero in 21 days (1 week after staples removed);  Call to schedule.   11. Pain Medication: eRX sent to your pharmacy for  if you go home. Principal Discharge DX:	Primary osteoarthritis of right hip  Goal:	less pain and improved function  Assessment and plan of treatment:	Discharge Instructions Total Hip Arthroplasty    1. Diet: Resume previous diet  2. Activity: WBAT. Rolling walker. Posterior Hip Dislocation Precautions. Abduction Pillow while in bed for 6 weeks. Daily Physical Therapy.  3. Call with: fever over 101, wound redness, drainage or open area, calf pain/calf swelling.  4. Wound Care: Remove old and Place new Aquacel bandage to hip wound every 7days. No bandage needed after staple removal.  5. RN to Remove Staples Post Op Day #14 (7/20/18) so long as wound is healed, no drainage or open area.   6. OK to Shower with Aquacel. Must be an Aquacel. Avoid direct water beating on bandage.   7. DVT PE Prophylaxis: Managed by Anticoag Team. See Anticoagulation Instructions. See Med Rec.  8.  Continue Protonix daily while on Anticoagulant. An eRx has been sent to your pharmacy.  9. Labs: Check H&H weekly while on Anticoagulation. Check INR if on Coumadin.  10.  Follow Up: Dr. Quintero in 21 days (1 week after staples removed);  Call to schedule.   11. Pain Medication: eRX sent to your pharmacy for  if you go home.  Secondary Diagnosis:	Urinary retention with incomplete bladder emptying  Goal:	spontaneous complete voiding  Assessment and plan of treatment:	ELLEN Holt. Call for voiding trial in 1-2 days [phone] Principal Discharge DX:	Primary osteoarthritis of right hip  Goal:	less pain and improved function  Assessment and plan of treatment:	Discharge Instructions Total Hip Arthroplasty    1. Diet: Resume previous diet  2. Activity: WBAT. Rolling walker. Posterior Hip Dislocation Precautions. Abduction Pillow while in bed for 6 weeks. Daily Physical Therapy.  3. Call with: fever over 101, wound redness, drainage or open area, calf pain/calf swelling.  4. Wound Care: Remove old and Place new Aquacel bandage to hip wound every 7days. No bandage needed after staple removal.  5. RN to Remove Staples Post Op Day #14 (7/20/18) so long as wound is healed, no drainage or open area.   6. OK to Shower with Aquacel. Must be an Aquacel. Avoid direct water beating on bandage.   7. DVT PE Prophylaxis: Managed by Anticoag Team. See Anticoagulation Instructions. See Med Rec.  8.  Continue Protonix daily while on Anticoagulant. An eRx has been sent to your pharmacy.  9. Labs: Check H&H weekly while on Anticoagulation. Check INR if on Coumadin.  10.  Follow Up: Dr. Quintero in 21 days (1 week after staples removed);  Call to schedule.   11. Pain Medication: eRX sent to your pharmacy for  if you go home.  Secondary Diagnosis:	Urinary retention with incomplete bladder emptying  Goal:	spontaneous complete voiding  Assessment and plan of treatment:	FU Dr. Holt. Perform voiding trial at rehab in 1 days and please call Dr. Holt with result [phone]. Spoke with Dr. Holt about this.

## 2018-07-07 LAB
ANION GAP SERPL CALC-SCNC: 7 MMOL/L — SIGNIFICANT CHANGE UP (ref 5–17)
BUN SERPL-MCNC: 17 MG/DL — SIGNIFICANT CHANGE UP (ref 7–23)
CALCIUM SERPL-MCNC: 8.2 MG/DL — LOW (ref 8.5–10.1)
CHLORIDE SERPL-SCNC: 101 MMOL/L — SIGNIFICANT CHANGE UP (ref 96–108)
CO2 SERPL-SCNC: 29 MMOL/L — SIGNIFICANT CHANGE UP (ref 22–31)
CREAT SERPL-MCNC: 0.74 MG/DL — SIGNIFICANT CHANGE UP (ref 0.5–1.3)
GLUCOSE BLDC GLUCOMTR-MCNC: 176 MG/DL — HIGH (ref 70–99)
GLUCOSE BLDC GLUCOMTR-MCNC: 195 MG/DL — HIGH (ref 70–99)
GLUCOSE BLDC GLUCOMTR-MCNC: 196 MG/DL — HIGH (ref 70–99)
GLUCOSE BLDC GLUCOMTR-MCNC: 214 MG/DL — HIGH (ref 70–99)
GLUCOSE SERPL-MCNC: 161 MG/DL — HIGH (ref 70–99)
HBA1C BLD-MCNC: 7.2 % — HIGH (ref 4–5.6)
HCT VFR BLD CALC: 32.4 % — LOW (ref 39–50)
HGB BLD-MCNC: 11.1 G/DL — LOW (ref 13–17)
INR BLD: 1.23 RATIO — HIGH (ref 0.88–1.16)
MCHC RBC-ENTMCNC: 31.2 PG — SIGNIFICANT CHANGE UP (ref 27–34)
MCHC RBC-ENTMCNC: 34.3 GM/DL — SIGNIFICANT CHANGE UP (ref 32–36)
MCV RBC AUTO: 91 FL — SIGNIFICANT CHANGE UP (ref 80–100)
NRBC # BLD: 0 /100 WBCS — SIGNIFICANT CHANGE UP (ref 0–0)
PLATELET # BLD AUTO: 213 K/UL — SIGNIFICANT CHANGE UP (ref 150–400)
POTASSIUM SERPL-MCNC: 4.3 MMOL/L — SIGNIFICANT CHANGE UP (ref 3.5–5.3)
POTASSIUM SERPL-SCNC: 4.3 MMOL/L — SIGNIFICANT CHANGE UP (ref 3.5–5.3)
PROTHROM AB SERPL-ACNC: 13.3 SEC — HIGH (ref 9.8–12.7)
RBC # BLD: 3.56 M/UL — LOW (ref 4.2–5.8)
RBC # FLD: 12.6 % — SIGNIFICANT CHANGE UP (ref 10.3–14.5)
SODIUM SERPL-SCNC: 137 MMOL/L — SIGNIFICANT CHANGE UP (ref 135–145)
WBC # BLD: 9.76 K/UL — SIGNIFICANT CHANGE UP (ref 3.8–10.5)
WBC # FLD AUTO: 9.76 K/UL — SIGNIFICANT CHANGE UP (ref 3.8–10.5)

## 2018-07-07 PROCEDURE — 93010 ELECTROCARDIOGRAM REPORT: CPT

## 2018-07-07 PROCEDURE — 99233 SBSQ HOSP IP/OBS HIGH 50: CPT

## 2018-07-07 RX ORDER — SIMETHICONE 80 MG/1
80 TABLET, CHEWABLE ORAL ONCE
Qty: 0 | Refills: 0 | Status: COMPLETED | OUTPATIENT
Start: 2018-07-07 | End: 2018-07-07

## 2018-07-07 RX ORDER — SODIUM CHLORIDE 9 MG/ML
1000 INJECTION INTRAMUSCULAR; INTRAVENOUS; SUBCUTANEOUS ONCE
Qty: 0 | Refills: 0 | Status: COMPLETED | OUTPATIENT
Start: 2018-07-07 | End: 2018-07-07

## 2018-07-07 RX ORDER — ENOXAPARIN SODIUM 100 MG/ML
40 INJECTION SUBCUTANEOUS DAILY
Qty: 0 | Refills: 0 | Status: DISCONTINUED | OUTPATIENT
Start: 2018-07-08 | End: 2018-07-10

## 2018-07-07 RX ORDER — ENOXAPARIN SODIUM 100 MG/ML
40 INJECTION SUBCUTANEOUS
Qty: 5 | Refills: 1 | OUTPATIENT
Start: 2018-07-07 | End: 2018-07-16

## 2018-07-07 RX ORDER — WARFARIN SODIUM 2.5 MG/1
1 TABLET ORAL
Qty: 30 | Refills: 1
Start: 2018-07-07 | End: 2018-09-04

## 2018-07-07 RX ADMIN — FINASTERIDE 5 MILLIGRAM(S): 5 TABLET, FILM COATED ORAL at 11:26

## 2018-07-07 RX ADMIN — Medication 2: at 12:05

## 2018-07-07 RX ADMIN — HEPARIN SODIUM 5000 UNIT(S): 5000 INJECTION INTRAVENOUS; SUBCUTANEOUS at 14:38

## 2018-07-07 RX ADMIN — Medication 650 MILLIGRAM(S): at 02:39

## 2018-07-07 RX ADMIN — HEPARIN SODIUM 5000 UNIT(S): 5000 INJECTION INTRAVENOUS; SUBCUTANEOUS at 21:55

## 2018-07-07 RX ADMIN — Medication 25 MILLIGRAM(S): at 01:08

## 2018-07-07 RX ADMIN — SODIUM CHLORIDE 3 MILLILITER(S): 9 INJECTION INTRAMUSCULAR; INTRAVENOUS; SUBCUTANEOUS at 05:29

## 2018-07-07 RX ADMIN — PANTOPRAZOLE SODIUM 40 MILLIGRAM(S): 20 TABLET, DELAYED RELEASE ORAL at 11:26

## 2018-07-07 RX ADMIN — Medication 500 MILLIGRAM(S): at 17:40

## 2018-07-07 RX ADMIN — OXYCODONE HYDROCHLORIDE 10 MILLIGRAM(S): 5 TABLET ORAL at 02:40

## 2018-07-07 RX ADMIN — OXYCODONE HYDROCHLORIDE 10 MILLIGRAM(S): 5 TABLET ORAL at 03:15

## 2018-07-07 RX ADMIN — Medication 650 MILLIGRAM(S): at 17:41

## 2018-07-07 RX ADMIN — Medication 25 MILLIGRAM(S): at 14:38

## 2018-07-07 RX ADMIN — Medication 1 TABLET(S): at 11:27

## 2018-07-07 RX ADMIN — Medication 1 MILLIGRAM(S): at 11:27

## 2018-07-07 RX ADMIN — OXYCODONE HYDROCHLORIDE 10 MILLIGRAM(S): 5 TABLET ORAL at 05:32

## 2018-07-07 RX ADMIN — SODIUM CHLORIDE 2000 MILLILITER(S): 9 INJECTION INTRAMUSCULAR; INTRAVENOUS; SUBCUTANEOUS at 11:26

## 2018-07-07 RX ADMIN — Medication 1: at 17:40

## 2018-07-07 RX ADMIN — SODIUM CHLORIDE 3 MILLILITER(S): 9 INJECTION INTRAMUSCULAR; INTRAVENOUS; SUBCUTANEOUS at 21:47

## 2018-07-07 RX ADMIN — SIMETHICONE 80 MILLIGRAM(S): 80 TABLET, CHEWABLE ORAL at 07:00

## 2018-07-07 RX ADMIN — Medication 25 MILLIGRAM(S): at 21:55

## 2018-07-07 RX ADMIN — Medication 100 MILLIGRAM(S): at 14:38

## 2018-07-07 RX ADMIN — Medication 2 GRAM(S): at 17:40

## 2018-07-07 RX ADMIN — TAMSULOSIN HYDROCHLORIDE 0.4 MILLIGRAM(S): 0.4 CAPSULE ORAL at 21:55

## 2018-07-07 RX ADMIN — OXYCODONE HYDROCHLORIDE 10 MILLIGRAM(S): 5 TABLET ORAL at 17:41

## 2018-07-07 RX ADMIN — Medication 25 MILLIGRAM(S): at 05:33

## 2018-07-07 RX ADMIN — SODIUM CHLORIDE 3 MILLILITER(S): 9 INJECTION INTRAMUSCULAR; INTRAVENOUS; SUBCUTANEOUS at 15:35

## 2018-07-07 RX ADMIN — Medication 650 MILLIGRAM(S): at 02:47

## 2018-07-07 RX ADMIN — WARFARIN SODIUM 5 MILLIGRAM(S): 2.5 TABLET ORAL at 21:55

## 2018-07-07 RX ADMIN — Medication 100 MILLIGRAM(S): at 21:55

## 2018-07-07 RX ADMIN — Medication 650 MILLIGRAM(S): at 11:27

## 2018-07-07 RX ADMIN — ATORVASTATIN CALCIUM 20 MILLIGRAM(S): 80 TABLET, FILM COATED ORAL at 21:55

## 2018-07-07 RX ADMIN — Medication 1: at 08:01

## 2018-07-07 RX ADMIN — HEPARIN SODIUM 5000 UNIT(S): 5000 INJECTION INTRAVENOUS; SUBCUTANEOUS at 05:33

## 2018-07-07 RX ADMIN — OXYCODONE HYDROCHLORIDE 10 MILLIGRAM(S): 5 TABLET ORAL at 07:00

## 2018-07-07 RX ADMIN — OXYCODONE HYDROCHLORIDE 10 MILLIGRAM(S): 5 TABLET ORAL at 05:33

## 2018-07-07 RX ADMIN — Medication 650 MILLIGRAM(S): at 12:01

## 2018-07-07 NOTE — PROGRESS NOTE ADULT - ASSESSMENT
This is a 73 year old male s/p right total hip replacement with high risk for VTE due to age, immobility, BMI, surgery; low bleeding risk.    Discussed the necessity of VTE prophylaxis with coumadin. Educated patient on INR, value, meaning, and effects medications and foods may have on it. Will further reinforce coumadin education and follow up on outpatient basis.     Plan:  ::Coumadin 5 mg PO daily x 4 weeks total adjust dose per INR  ::Heparin 5,000 units SQ Q8hour until INR 1.8 or > x two days  ::Protonix 40 mg PO daily  ::Daily PT/INR  ::Daily CBC/BMP  ::Enc ambulation  ::Venodynes    Will continue to follow.

## 2018-07-07 NOTE — PROGRESS NOTE ADULT - SUBJECTIVE AND OBJECTIVE BOX
Patient seen and examined. Pain controlled.    Vital Signs Last 24 Hrs  T(C): 37.7 (07 Jul 2018 04:58), Max: 38 (07 Jul 2018 04:18)  T(F): 99.9 (07 Jul 2018 04:58), Max: 100.4 (07 Jul 2018 04:18)  HR: 91 (07 Jul 2018 04:58) (65 - 93)  BP: 105/55 (07 Jul 2018 04:58) (82/47 - 124/77)  BP(mean): --  RR: 18 (07 Jul 2018 04:58) (13 - 21)  SpO2: 95% (07 Jul 2018 04:58) (93% - 99%)    PE Right hip   dressing c/d/i   abduction pillow intact   compartments soft non tender  FROM ankle and toes  + EHL FHL GS TA   SILT   DP intact         A: 73M s/p Right JOSEPHINE POD 1    p:  pain control   DVT ppx   post op abx  venodynes  incentive spirometer  therapy   WBAT RLE   posterior hip precautions   abduction pillow Patient seen and examined. Pain controlled.  Pt c/o chest heaviness overnight, EKG ordered/medicine called, EKG wnl, medical team treated for GERD.  Pt denies CP/SOB.    Vital Signs Last 24 Hrs  T(C): 37.7 (07 Jul 2018 04:58), Max: 38 (07 Jul 2018 04:18)  T(F): 99.9 (07 Jul 2018 04:58), Max: 100.4 (07 Jul 2018 04:18)  HR: 91 (07 Jul 2018 04:58) (65 - 93)  BP: 105/55 (07 Jul 2018 04:58) (82/47 - 124/77)  BP(mean): --  RR: 18 (07 Jul 2018 04:58) (13 - 21)  SpO2: 95% (07 Jul 2018 04:58) (93% - 99%)    PE Right hip   dressing c/d/i   abduction pillow intact   compartments soft non tender  FROM ankle and toes  + EHL FHL GS TA   SILT   DP intact         A: 73M s/p Right JOSEPHINE POD 1    p:  pain control   DVT ppx   post op abx  venodynes  incentive spirometer  therapy   WBAT RLE   posterior hip precautions   abduction pillow

## 2018-07-07 NOTE — CHART NOTE - NSCHARTNOTEFT_GEN_A_CORE
Notified by RN that Pt has chest heaviness. Pt states that symptoms have been going on for 5 hours. Symptoms similar to indigestion in the past. Was tx w/ maalox w/ no relief.  Not associated radiation, nausea, vomiting, lightheadedness, SOB, palpitation.    Vital Signs (24 Hrs):  T(C): 38 (07-07-18 @ 04:18), Max: 38 (07-07-18 @ 04:18)  HR: 91 (07-07-18 @ 04:18) (65 - 93)  BP: 101/59 (07-07-18 @ 04:18) (82/47 - 124/77)  RR: 18 (07-07-18 @ 04:18) (13 - 21)  SpO2: 93% (07-07-18 @ 04:18) (93% - 99%)    Physical Exam:    General: NAD  Heart: S1/S2+, NRR  Lungs: CTABL  LE: no pedal edema    A%P:    Dyspepsia    - EKG shows no acute ischemic changes  - Will give simethicone  - Will monitor clinically  - F/u w/ primary team for further management    D/W Jewel Velasquez PGY 3 Notified by RN that Pt has chest heaviness. Pt states that symptoms have been going on for 5 hours. Symptoms similar to indigestion in the past. Was tx w/ maalox w/ no relief.  Not associated radiation, nausea, vomiting, lightheadedness, SOB, palpitation.    Vital Signs (24 Hrs):  T(C): 38 (07-07-18 @ 04:18), Max: 38 (07-07-18 @ 04:18)  HR: 91 (07-07-18 @ 04:18) (65 - 93)  BP: 101/59 (07-07-18 @ 04:18) (82/47 - 124/77)  RR: 18 (07-07-18 @ 04:18) (13 - 21)  SpO2: 93% (07-07-18 @ 04:18) (93% - 99%)    Physical Exam:    General: NAD  Heart: S1/S2+, NRR  Lungs: CTABL  LE: no pedal edema    A&P:    Dyspepsia    - EKG shows no acute ischemic changes  - Will give simethicone  - Will monitor clinically  - F/u w/ primary team for further management    D/W Jewel Velasquez PGY 3 Notified by RN that pt has chest heaviness. Pt states that symptoms have been going on for 5 hours. Symptoms similar to indigestion in the past. Was tx w/ maalox w/ no relief.  Not associated radiation, nausea, vomiting, lightheadedness, SOB, palpitation.    Vital Signs (24 Hrs):  T(C): 38 (07-07-18 @ 04:18), Max: 38 (07-07-18 @ 04:18)  HR: 91 (07-07-18 @ 04:18) (65 - 93)  BP: 101/59 (07-07-18 @ 04:18) (82/47 - 124/77)  RR: 18 (07-07-18 @ 04:18) (13 - 21)  SpO2: 93% (07-07-18 @ 04:18) (93% - 99%)    Physical Exam:    General: NAD  Heart: S1/S2+, NRR  Lungs: CTABL  LE: no pedal edema    A&P:    Dyspepsia    - EKG shows no acute ischemic changes  - Will give simethicone  - Will monitor clinically  - F/u w/ primary team for further management    D/W Jewel Velasquez PGY 3

## 2018-07-07 NOTE — PROGRESS NOTE ADULT - SUBJECTIVE AND OBJECTIVE BOX
HPI:    Patient is a 73y old  Male who presents with a chief complaint of "I have pain to my right hip." (06 Jul 2018 07:44)    Consulted by Dr. Quintero for VTE prophylaxis, risk stratification, and anticoagulation management.    PAST MEDICAL & SURGICAL HISTORY:  Cataract of both eyes, unspecified cataract type  Spinal stenosis of lumbar region, unspecified whether neurogenic claudication present  Primary osteoarthritis of left knee: history of. knee replacement  Pain of both hip joints  Primary osteoarthritis of right hip  Urinary frequency  History of colon polyps  Tinnitus of both ears  CAD (coronary artery disease)  Hyperlipidemia  KENDRA (obstructive sleep apnea): does not use CPAP  BPH (benign prostatic hypertrophy)  Psoriasis  Sleep apnea: does not use device  Diabetes  H/O total knee replacement, left: 2016  CAD (coronary artery disease): cardiac cath 7/9/15 negative  Encounter for screening colonoscopy  S/P left knee arthroscopy    BMI: 31.1    CrCl: 128.7    Caprini VTE Risk Score: 8 (high)    IMPROVE Bleeding Risk Score: 2.5 (low)    Falls Risk:   High (x  )  Mod (  )  Low (  )    7/6: Patient seen at bedside in PACU, explained coumadin to patient- he thinks he was on for joint replacement in past- denies any issues with bleeding/clotting.   7/7: Patient seen at bedside- bad night with "no sleep" due to pain and positioning issues. Discussed Coumadin again today and provided with educational folder. INR today 1.23. Patient planning on being dc'ed to home.    FAMILY HISTORY:  Family history of cancer of GI tract (Father)  Family history of asthma (Mother)  Family history of arthritis (Mother)  Family history of heart failure (Mother)    Denies any personal or familial history of clotting or bleeding disorders.    Allergies    No Known Allergies    Intolerances    REVIEW OF SYSTEMS    (  )Fever	     (  )Constipation	(  )SOB				(  )Headache	(  )Dysuria  (  )Chills	     (  )Melena	(  )Dyspnea present on exertion	                    (  )Dizziness                    (  )Polyuria  (  )Nausea	     (  )Hematochezia	(  )Cough			                    (  )Syncope   	(  )Hematuria  (  )Vomiting    (  )Chest Pain	(  )Wheezing			(  )Weakness  (  )Diarrhea     (  )Palpitations	(  )Anorexia			(  )Myalgia    All other review of systems negative: Yes    PHYSICAL EXAM:    Constitutional: Appears Well    Neurological: A& O x 3    Skin: Warm    Respiratory and Thorax: normal effort; Breath sounds: normal; No rales/wheezing/rhonchi  	  Cardiovascular: S1, S2, regular, NMBR	    Gastrointestinal: BS + x 4Q, nontender	    Genitourinary:  Bladder nondistended, nontender    Musculoskeletal:   General Right:   + muscle/joint tenderness,   normal tone, no joint swelling,   ROM: limited	    General Left:   no muscle/joint tenderness,   normal tone, no joint swelling,   ROM: full    Hip:  Right: Dressing CDI    Lower extrems:   Right: no calf tenderness              negative maury's sign               + pedal pulses    Left:   no calf tenderness              negative maury's sign               + pedal pulses                          11.1   9.76  )-----------( 213      ( 07 Jul 2018 05:28 )             32.4       07-07    137  |  101  |  17  ----------------------------<  161<H>  4.3   |  29  |  0.74    Ca    8.2<L>      07 Jul 2018 05:28        PT/INR - ( 07 Jul 2018 05:28 )   PT: 13.3 sec;   INR: 1.23 ratio         PTT - ( 06 Jul 2018 07:38 )  PTT:30.1 sec    PT/INR - ( 06 Jul 2018 07:38 )   PT: 11.6 sec;   INR: 1.07 ratio         PTT - ( 06 Jul 2018 07:38 )  PTT:30.1 sec				    MEDICATIONS  (STANDING):  ascorbic acid 500 milliGRAM(s) Oral two times a day  ceFAZolin   IVPB 2000 milliGRAM(s) IV Intermittent every 8 hours  docusate sodium 100 milliGRAM(s) Oral three times a day  folic acid 1 milliGRAM(s) Oral daily  heparin  Injectable 5000 Unit(s) SubCutaneous every 8 hours  lactated ringers. 1000 milliLiter(s) IV Continuous <Continuous>  multivitamin 1 Tablet(s) Oral daily  pantoprazole    Tablet 40 milliGRAM(s) Oral daily  polyethylene glycol 3350 17 Gram(s) Oral daily  warfarin 5 milliGRAM(s) Oral daily      Vital Signs Last 24 Hrs  T(C): 37.7 (07-07-18 @ 04:58), Max: 38 (07-07-18 @ 04:18)  T(F): 99.9 (07-07-18 @ 04:58), Max: 100.4 (07-07-18 @ 04:18)  HR: 91 (07-07-18 @ 04:58) (65 - 93)  BP: 105/55 (07-07-18 @ 04:58) (82/47 - 124/77)  BP(mean): --  RR: 18 (07-07-18 @ 04:58) (13 - 21)  SpO2: 95% (07-07-18 @ 04:58) (93% - 99%)    DVT Prophylaxis:  LMWH                   (  )  Heparin SQ           (x  )  Coumadin             (x  )  Xarelto                  (  )  Eliquis                   (  )  Venodynes           ( x )  Ambulation          (x  )  UFH                       (  )  Contraindicated  (  )

## 2018-07-07 NOTE — PROGRESS NOTE ADULT - SUBJECTIVE AND OBJECTIVE BOX
PCP- DR Smart    CC- s/p RT THR    HPI:  74yo/M with PMH non-obstructive CAD, BPH, Diabetes, hyperlipidemia, psoriasis, OA with prior LT TKR presented for elective RT THR. Patient has had long-standing OA affecting his ambulation, failed outpatient medical treatment and required surgery. Medical consult called for postop medical management    PMH- as above  PSH- LT THR  Soc hx- denies smoking, alcohol-socially  Fam hx- non-contributory    7/6/18- c/o lower abd pressure and inability to urinate  7/7/18 still getting straight cath. Slightly hypotensive earlier    Review of system- All 10 systems reviewed and is as per HPI otherwise negative.     Vital Signs Last 24 Hrs  T(C): 37 (07 Jul 2018 11:35), Max: 38 (07 Jul 2018 04:18)  T(F): 98.6 (07 Jul 2018 11:35), Max: 100.4 (07 Jul 2018 04:18)  HR: 75 (07 Jul 2018 11:35) (73 - 93)  BP: 105/55 (07 Jul 2018 04:58) (101/59 - 119/70)  BP(mean): --  RR: 16 (07 Jul 2018 11:35) (14 - 18)  SpO2: 96% (07 Jul 2018 11:35) (93% - 99%)    LABS:                        11.1   9.76  )-----------( 213      ( 07 Jul 2018 05:28 )             32.4     07 Jul 2018 05:28    137    |  101    |  17     ----------------------------<  161    4.3     |  29     |  0.74     Ca    8.2        07 Jul 2018 05:28  PT/INR - ( 07 Jul 2018 05:28 )   PT: 13.3 sec;   INR: 1.23 ratio     PTT - ( 06 Jul 2018 07:38 )  PTT:30.1 sec    POCT Blood Glucose.: 214 mg/dL (07 Jul 2018 12:03)  POCT Blood Glucose.: 196 mg/dL (07 Jul 2018 07:45)  POCT Blood Glucose.: 197 mg/dL (06 Jul 2018 20:26)  POCT Blood Glucose.: 237 mg/dL (06 Jul 2018 16:42)    RADIOLOGY & ADDITIONAL TESTS:      PHYSICAL EXAM:  GENERAL: NAD, well-groomed, well-developed  HEAD:  Atraumatic, Normocephalic  EYES: EOMI, PERRLA, conjunctiva and sclera clear  HEENT: Moist mucous membranes  NECK: Supple, No JVD  NERVOUS SYSTEM:  Alert & Oriented X3, Motor Strength 5/5 B/L upper and lower extremities; DTRs 2+ intact and symmetric  CHEST/LUNG: Clear to auscultation bilaterally; No rales, rhonchi, wheezing, or rubs  HEART: Regular rate and rhythm; No murmurs, rubs, or gallops  ABDOMEN: Soft, Nontender, Nondistended; Bowel sounds present  GENITOURINARY- +distended bladder  EXTREMITIES:  2+ Peripheral Pulses, No clubbing, cyanosis, or edema  MUSCULOSKELTAL- RT hip dressing dry  SKIN-no rash, no lesion  CNS- alert, oriented X3, non focal     Daily Height in cm: 182.88 (06 Jul 2018 07:35)      MEDICATIONS  (STANDING):  acetaminophen   Tablet. 650 milliGRAM(s) Oral every 6 hours  ascorbic acid 500 milliGRAM(s) Oral two times a day  atorvastatin 20 milliGRAM(s) Oral at bedtime  bethanechol 25 milliGRAM(s) Oral every 8 hours  dextrose 5%. 1000 milliLiter(s) (50 mL/Hr) IV Continuous <Continuous>  dextrose 50% Injectable 12.5 Gram(s) IV Push once  dextrose 50% Injectable 25 Gram(s) IV Push once  dextrose 50% Injectable 25 Gram(s) IV Push once  docusate sodium 100 milliGRAM(s) Oral three times a day  finasteride 5 milliGRAM(s) Oral daily  folic acid 1 milliGRAM(s) Oral daily  heparin  Injectable 5000 Unit(s) SubCutaneous every 8 hours  insulin lispro (HumaLOG) corrective regimen sliding scale   SubCutaneous three times a day before meals  insulin lispro (HumaLOG) corrective regimen sliding scale   SubCutaneous at bedtime  multivitamin 1 Tablet(s) Oral daily  omega-3-Acid Ethyl Esters 2 Gram(s) Oral two times a day  oxyCODONE  ER Tablet 10 milliGRAM(s) Oral every 12 hours  pantoprazole    Tablet 40 milliGRAM(s) Oral daily  polyethylene glycol 3350 17 Gram(s) Oral daily  sodium chloride 0.9% lock flush 3 milliLiter(s) IV Push every 8 hours  tamsulosin 0.4 milliGRAM(s) Oral at bedtime  warfarin 5 milliGRAM(s) Oral daily    MEDICATIONS  (PRN):  acetaminophen   Tablet 650 milliGRAM(s) Oral every 6 hours PRN For Temp greater than 38 C (100.4 F)  acetaminophen   Tablet. 650 milliGRAM(s) Oral every 6 hours PRN headache  benzocaine 15 mG/menthol 3.6 mG Lozenge 1 Lozenge Oral every 3 hours PRN Sore Throat  dextrose 40% Gel 15 Gram(s) Oral once PRN Blood Glucose LESS THAN 70 milliGRAM(s)/deciliter  diphenhydrAMINE   Capsule 25 milliGRAM(s) Oral at bedtime PRN Insomnia  diphenhydrAMINE   Capsule 25 milliGRAM(s) Oral every 6 hours PRN Rash and/or Itching  glucagon  Injectable 1 milliGRAM(s) IntraMuscular once PRN Glucose LESS THAN 70 milligrams/deciliter  HYDROmorphone  Injectable 0.5 milliGRAM(s) IV Push every 3 hours PRN Severe Pain (7 - 10)  ondansetron Injectable 4 milliGRAM(s) IV Push every 6 hours PRN Nausea and/or Vomiting  oxyCODONE    IR 5 milliGRAM(s) Oral every 4 hours PRN Mild Pain (1 - 3)  oxyCODONE    IR 10 milliGRAM(s) Oral every 4 hours PRN Moderate Pain (4 - 6)  senna 2 Tablet(s) Oral at bedtime PRN Constipation    Assessment/Plan  #S/p RT THR  Ortho f/u appreciated  PT as tolerated  Monitor HH  AC by Coumadin  Pain meds prn  Bowel regimen  Incentive spirometry    #Urinary retention likely 2 to BPH  Already on Flomax and Proscar  Add Bethenechol  Straight cath prn for bladder scan >350cc    #Diabetes- ISS    #Hypotension  S/p fluid bolus  Improved    #Dispo- likely home with home PT on Monday

## 2018-07-08 LAB
ANION GAP SERPL CALC-SCNC: 8 MMOL/L — SIGNIFICANT CHANGE UP (ref 5–17)
BUN SERPL-MCNC: 16 MG/DL — SIGNIFICANT CHANGE UP (ref 7–23)
CALCIUM SERPL-MCNC: 8.1 MG/DL — LOW (ref 8.5–10.1)
CHLORIDE SERPL-SCNC: 100 MMOL/L — SIGNIFICANT CHANGE UP (ref 96–108)
CO2 SERPL-SCNC: 25 MMOL/L — SIGNIFICANT CHANGE UP (ref 22–31)
CREAT SERPL-MCNC: 0.71 MG/DL — SIGNIFICANT CHANGE UP (ref 0.5–1.3)
GLUCOSE BLDC GLUCOMTR-MCNC: 153 MG/DL — HIGH (ref 70–99)
GLUCOSE BLDC GLUCOMTR-MCNC: 177 MG/DL — HIGH (ref 70–99)
GLUCOSE BLDC GLUCOMTR-MCNC: 183 MG/DL — HIGH (ref 70–99)
GLUCOSE BLDC GLUCOMTR-MCNC: 185 MG/DL — HIGH (ref 70–99)
GLUCOSE SERPL-MCNC: 147 MG/DL — HIGH (ref 70–99)
HCT VFR BLD CALC: 30.1 % — LOW (ref 39–50)
HGB BLD-MCNC: 10.5 G/DL — LOW (ref 13–17)
INR BLD: 1.64 RATIO — HIGH (ref 0.88–1.16)
MCHC RBC-ENTMCNC: 31.4 PG — SIGNIFICANT CHANGE UP (ref 27–34)
MCHC RBC-ENTMCNC: 34.9 GM/DL — SIGNIFICANT CHANGE UP (ref 32–36)
MCV RBC AUTO: 90.1 FL — SIGNIFICANT CHANGE UP (ref 80–100)
NRBC # BLD: 0 /100 WBCS — SIGNIFICANT CHANGE UP (ref 0–0)
PLATELET # BLD AUTO: 161 K/UL — SIGNIFICANT CHANGE UP (ref 150–400)
POTASSIUM SERPL-MCNC: 3.8 MMOL/L — SIGNIFICANT CHANGE UP (ref 3.5–5.3)
POTASSIUM SERPL-SCNC: 3.8 MMOL/L — SIGNIFICANT CHANGE UP (ref 3.5–5.3)
PROTHROM AB SERPL-ACNC: 17.9 SEC — HIGH (ref 9.8–12.7)
RBC # BLD: 3.34 M/UL — LOW (ref 4.2–5.8)
RBC # FLD: 12.6 % — SIGNIFICANT CHANGE UP (ref 10.3–14.5)
SODIUM SERPL-SCNC: 133 MMOL/L — LOW (ref 135–145)
WBC # BLD: 9.12 K/UL — SIGNIFICANT CHANGE UP (ref 3.8–10.5)
WBC # FLD AUTO: 9.12 K/UL — SIGNIFICANT CHANGE UP (ref 3.8–10.5)

## 2018-07-08 PROCEDURE — 99233 SBSQ HOSP IP/OBS HIGH 50: CPT

## 2018-07-08 RX ORDER — POLYETHYLENE GLYCOL 3350 17 G/17G
17 POWDER, FOR SOLUTION ORAL
Qty: 0 | Refills: 0 | Status: DISCONTINUED | OUTPATIENT
Start: 2018-07-08 | End: 2018-07-10

## 2018-07-08 RX ADMIN — Medication 25 MILLIGRAM(S): at 05:33

## 2018-07-08 RX ADMIN — OXYCODONE HYDROCHLORIDE 10 MILLIGRAM(S): 5 TABLET ORAL at 08:46

## 2018-07-08 RX ADMIN — Medication 1 TABLET(S): at 11:39

## 2018-07-08 RX ADMIN — Medication 650 MILLIGRAM(S): at 05:47

## 2018-07-08 RX ADMIN — Medication 100 MILLIGRAM(S): at 05:33

## 2018-07-08 RX ADMIN — Medication 1: at 13:17

## 2018-07-08 RX ADMIN — Medication 650 MILLIGRAM(S): at 01:45

## 2018-07-08 RX ADMIN — Medication 650 MILLIGRAM(S): at 01:15

## 2018-07-08 RX ADMIN — WARFARIN SODIUM 5 MILLIGRAM(S): 2.5 TABLET ORAL at 22:17

## 2018-07-08 RX ADMIN — Medication 650 MILLIGRAM(S): at 18:19

## 2018-07-08 RX ADMIN — Medication 1: at 18:19

## 2018-07-08 RX ADMIN — Medication 650 MILLIGRAM(S): at 11:39

## 2018-07-08 RX ADMIN — Medication 650 MILLIGRAM(S): at 05:33

## 2018-07-08 RX ADMIN — OXYCODONE HYDROCHLORIDE 10 MILLIGRAM(S): 5 TABLET ORAL at 18:34

## 2018-07-08 RX ADMIN — Medication 500 MILLIGRAM(S): at 18:19

## 2018-07-08 RX ADMIN — ENOXAPARIN SODIUM 40 MILLIGRAM(S): 100 INJECTION SUBCUTANEOUS at 11:39

## 2018-07-08 RX ADMIN — Medication 25 MILLIGRAM(S): at 13:18

## 2018-07-08 RX ADMIN — OXYCODONE HYDROCHLORIDE 10 MILLIGRAM(S): 5 TABLET ORAL at 18:38

## 2018-07-08 RX ADMIN — Medication 1 MILLIGRAM(S): at 11:39

## 2018-07-08 RX ADMIN — ATORVASTATIN CALCIUM 20 MILLIGRAM(S): 80 TABLET, FILM COATED ORAL at 22:17

## 2018-07-08 RX ADMIN — POLYETHYLENE GLYCOL 3350 17 GRAM(S): 17 POWDER, FOR SOLUTION ORAL at 11:40

## 2018-07-08 RX ADMIN — Medication 100 MILLIGRAM(S): at 13:18

## 2018-07-08 RX ADMIN — Medication 1: at 08:48

## 2018-07-08 RX ADMIN — Medication 500 MILLIGRAM(S): at 05:34

## 2018-07-08 RX ADMIN — Medication 650 MILLIGRAM(S): at 12:07

## 2018-07-08 RX ADMIN — PANTOPRAZOLE SODIUM 40 MILLIGRAM(S): 20 TABLET, DELAYED RELEASE ORAL at 11:40

## 2018-07-08 RX ADMIN — Medication 2 GRAM(S): at 18:19

## 2018-07-08 RX ADMIN — Medication 25 MILLIGRAM(S): at 22:17

## 2018-07-08 RX ADMIN — Medication 650 MILLIGRAM(S): at 18:31

## 2018-07-08 RX ADMIN — Medication 2 GRAM(S): at 05:34

## 2018-07-08 RX ADMIN — TAMSULOSIN HYDROCHLORIDE 0.4 MILLIGRAM(S): 0.4 CAPSULE ORAL at 22:17

## 2018-07-08 RX ADMIN — SODIUM CHLORIDE 3 MILLILITER(S): 9 INJECTION INTRAMUSCULAR; INTRAVENOUS; SUBCUTANEOUS at 17:48

## 2018-07-08 RX ADMIN — SODIUM CHLORIDE 3 MILLILITER(S): 9 INJECTION INTRAMUSCULAR; INTRAVENOUS; SUBCUTANEOUS at 22:12

## 2018-07-08 RX ADMIN — SODIUM CHLORIDE 3 MILLILITER(S): 9 INJECTION INTRAMUSCULAR; INTRAVENOUS; SUBCUTANEOUS at 05:29

## 2018-07-08 RX ADMIN — FINASTERIDE 5 MILLIGRAM(S): 5 TABLET, FILM COATED ORAL at 11:39

## 2018-07-08 NOTE — PROGRESS NOTE ADULT - SUBJECTIVE AND OBJECTIVE BOX
Patient seen and examined. Pain controlled. No acute O/N events    Vital Signs Last 24 Hrs  T(C): 37.3 (07 Jul 2018 23:45), Max: 37.3 (07 Jul 2018 23:45)  T(F): 99.1 (07 Jul 2018 23:45), Max: 99.1 (07 Jul 2018 23:45)  HR: 93 (08 Jul 2018 06:21) (75 - 96)  BP: 123/57 (08 Jul 2018 06:21) (94/62 - 136/83)  BP(mean): 58 (07 Jul 2018 12:48) (58 - 58)  RR: 16 (08 Jul 2018 06:21) (16 - 16)  SpO2: 92% (08 Jul 2018 06:21) (92% - 96%)    PE Right hip   dressing c/d/i   abduction pillow intact   compartments soft non tender  FROM ankle and toes  + EHL FHL GS TA   SILT L2-S1  DP intact

## 2018-07-08 NOTE — PROGRESS NOTE ADULT - SUBJECTIVE AND OBJECTIVE BOX
PCP- DR Smart    CC- s/p RT THR    HPI:  74yo/M with PMH non-obstructive CAD, BPH, Diabetes, hyperlipidemia, psoriasis, OA with prior LT TKR presented for elective RT THR. Patient has had long-standing OA affecting his ambulation, failed outpatient medical treatment and required surgery. Medical consult called for postop medical management    PMH- as above  PSH- LT THR  Soc hx- denies smoking, alcohol-socially  Fam hx- non-contributory    7/6/18- c/o lower abd pressure and inability to urinate  7/7/18 still getting straight cath. Slightly hypotensive earlier  7/8/18 s/p Flanagan. +constipated    Review of system- All 10 systems reviewed and is as per HPI otherwise negative.     Vital Signs Last 24 Hrs  T(C): 37.3 (07 Jul 2018 23:45), Max: 37.3 (07 Jul 2018 23:45)  T(F): 99.1 (07 Jul 2018 23:45), Max: 99.1 (07 Jul 2018 23:45)  HR: 93 (08 Jul 2018 06:21) (87 - 96)  BP: 123/57 (08 Jul 2018 06:21) (98/46 - 136/83)  BP(mean): 58 (07 Jul 2018 12:48) (58 - 58)  RR: 16 (08 Jul 2018 06:21) (16 - 16)  SpO2: 92% (08 Jul 2018 06:21) (92% - 95%)    LABS:                        10.5   9.12  )-----------( 161      ( 08 Jul 2018 06:48 )             30.1     08 Jul 2018 06:48    133    |  100    |  16     ----------------------------<  147    3.8     |  25     |  0.71     Ca    8.1        08 Jul 2018 06:48  PT/INR - ( 08 Jul 2018 06:48 )   PT: 17.9 sec;   INR: 1.64 ratio      POCT Blood Glucose.: 177 mg/dL (08 Jul 2018 07:40)  POCT Blood Glucose.: 195 mg/dL (07 Jul 2018 21:50)  POCT Blood Glucose.: 176 mg/dL (07 Jul 2018 16:46)  POCT Blood Glucose.: 214 mg/dL (07 Jul 2018 12:03)    RADIOLOGY & ADDITIONAL TESTS:      PHYSICAL EXAM:  GENERAL: NAD, well-groomed, well-developed  HEAD:  Atraumatic, Normocephalic  EYES: EOMI, PERRLA, conjunctiva and sclera clear  HEENT: Moist mucous membranes  NECK: Supple, No JVD  NERVOUS SYSTEM:  Alert & Oriented X3, Motor Strength 5/5 B/L upper and lower extremities; DTRs 2+ intact and symmetric  CHEST/LUNG: Clear to auscultation bilaterally; No rales, rhonchi, wheezing, or rubs  HEART: Regular rate and rhythm; No murmurs, rubs, or gallops  ABDOMEN: Soft, Nontender, Nondistended; Bowel sounds present  GENITOURINARY- +distended bladder  EXTREMITIES:  2+ Peripheral Pulses, No clubbing, cyanosis, or edema  MUSCULOSKELTAL- RT hip dressing dry  SKIN-no rash, no lesion  CNS- alert, oriented X3, non focal     Daily Height in cm: 182.88 (06 Jul 2018 07:35)      MEDICATIONS  (STANDING):  acetaminophen   Tablet. 650 milliGRAM(s) Oral every 6 hours  ascorbic acid 500 milliGRAM(s) Oral two times a day  atorvastatin 20 milliGRAM(s) Oral at bedtime  bethanechol 25 milliGRAM(s) Oral every 8 hours  dextrose 5%. 1000 milliLiter(s) (50 mL/Hr) IV Continuous <Continuous>  dextrose 50% Injectable 12.5 Gram(s) IV Push once  dextrose 50% Injectable 25 Gram(s) IV Push once  dextrose 50% Injectable 25 Gram(s) IV Push once  docusate sodium 100 milliGRAM(s) Oral three times a day  finasteride 5 milliGRAM(s) Oral daily  folic acid 1 milliGRAM(s) Oral daily  heparin  Injectable 5000 Unit(s) SubCutaneous every 8 hours  insulin lispro (HumaLOG) corrective regimen sliding scale   SubCutaneous three times a day before meals  insulin lispro (HumaLOG) corrective regimen sliding scale   SubCutaneous at bedtime  multivitamin 1 Tablet(s) Oral daily  omega-3-Acid Ethyl Esters 2 Gram(s) Oral two times a day  oxyCODONE  ER Tablet 10 milliGRAM(s) Oral every 12 hours  pantoprazole    Tablet 40 milliGRAM(s) Oral daily  polyethylene glycol 3350 17 Gram(s) Oral daily  sodium chloride 0.9% lock flush 3 milliLiter(s) IV Push every 8 hours  tamsulosin 0.4 milliGRAM(s) Oral at bedtime  warfarin 5 milliGRAM(s) Oral daily    MEDICATIONS  (PRN):  acetaminophen   Tablet 650 milliGRAM(s) Oral every 6 hours PRN For Temp greater than 38 C (100.4 F)  acetaminophen   Tablet. 650 milliGRAM(s) Oral every 6 hours PRN headache  benzocaine 15 mG/menthol 3.6 mG Lozenge 1 Lozenge Oral every 3 hours PRN Sore Throat  dextrose 40% Gel 15 Gram(s) Oral once PRN Blood Glucose LESS THAN 70 milliGRAM(s)/deciliter  diphenhydrAMINE   Capsule 25 milliGRAM(s) Oral at bedtime PRN Insomnia  diphenhydrAMINE   Capsule 25 milliGRAM(s) Oral every 6 hours PRN Rash and/or Itching  glucagon  Injectable 1 milliGRAM(s) IntraMuscular once PRN Glucose LESS THAN 70 milligrams/deciliter  HYDROmorphone  Injectable 0.5 milliGRAM(s) IV Push every 3 hours PRN Severe Pain (7 - 10)  ondansetron Injectable 4 milliGRAM(s) IV Push every 6 hours PRN Nausea and/or Vomiting  oxyCODONE    IR 5 milliGRAM(s) Oral every 4 hours PRN Mild Pain (1 - 3)  oxyCODONE    IR 10 milliGRAM(s) Oral every 4 hours PRN Moderate Pain (4 - 6)  senna 2 Tablet(s) Oral at bedtime PRN Constipation    Assessment/Plan  #S/p RT THR  Ortho f/u appreciated  PT as tolerated  Monitor HH  AC by Coumadin  Pain meds prn  Bowel regimen  Incentive spirometry    #Urinary retention likely 2 to BPH s/p Flanagan placement  Already on Flomax and Proscar  Add Bethenechol  Will discharge to rehab with Flanagan for voiding trial towards the end of next week    #Constipation  Bowel reigmen    #Diabetes- ISS    #Hypotension  S/p fluid bolus  Improved    #Dispo- [facility] tomorrow with Flanagan for voiding trial in rehab

## 2018-07-08 NOTE — PROGRESS NOTE ADULT - ASSESSMENT
A: 73M s/p Right JOSEPHINE POD2  pain control   DVT ppx   post op abx  venodynes  incentive spirometer  therapy   WBAT RLE   posterior hip precautions   abduction pillow

## 2018-07-08 NOTE — PROGRESS NOTE ADULT - SUBJECTIVE AND OBJECTIVE BOX
HPI:    Patient is a 73y old  Male who presents with a chief complaint of "I have pain to my right hip." (06 Jul 2018 07:44)    Consulted by Dr. Quintero for VTE prophylaxis, risk stratification, and anticoagulation management.    PAST MEDICAL & SURGICAL HISTORY:  Cataract of both eyes, unspecified cataract type  Spinal stenosis of lumbar region, unspecified whether neurogenic claudication present  Primary osteoarthritis of left knee: history of. knee replacement  Pain of both hip joints  Primary osteoarthritis of right hip  Urinary frequency  History of colon polyps  Tinnitus of both ears  CAD (coronary artery disease)  Hyperlipidemia  KENDRA (obstructive sleep apnea): does not use CPAP  BPH (benign prostatic hypertrophy)  Psoriasis  Sleep apnea: does not use device  Diabetes  H/O total knee replacement, left: 2016  CAD (coronary artery disease): cardiac cath 7/9/15 negative  Encounter for screening colonoscopy  S/P left knee arthroscopy    BMI: 31.1    CrCl: 128.7    Caprini VTE Risk Score: 8 (high)    IMPROVE Bleeding Risk Score: 2.5 (low)    Falls Risk:   High (x  )  Mod (  )  Low (  )    7/6: Patient seen at bedside in PACU, explained coumadin to patient- he thinks he was on for joint replacement in past- denies any issues with bleeding/clotting.   7/7: Patient seen at bedside- bad night with "no sleep" due to pain and positioning issues. Discussed Coumadin again today and provided with educational folder. INR today 1.23. Patient planning on being dc'ed to home.  7/8: Patient seen at bedside, discussed that he needs to increase mobility as he does live alone. INR today 1.64.    FAMILY HISTORY:  Family history of cancer of GI tract (Father)  Family history of asthma (Mother)  Family history of arthritis (Mother)  Family history of heart failure (Mother)    Denies any personal or familial history of clotting or bleeding disorders.    Allergies    No Known Allergies    Intolerances    REVIEW OF SYSTEMS    (  )Fever	     (  )Constipation	(  )SOB				(  )Headache	(  )Dysuria  (  )Chills	     (  )Melena	(  )Dyspnea present on exertion	                    (  )Dizziness                    (  )Polyuria  (  )Nausea	     (  )Hematochezia	(  )Cough			                    (  )Syncope   	(  )Hematuria  (  )Vomiting    (  )Chest Pain	(  )Wheezing			(  )Weakness  (  )Diarrhea     (  )Palpitations	(  )Anorexia			(  )Myalgia    All other review of systems negative: Yes    PHYSICAL EXAM:    Constitutional: Appears Well    Neurological: A& O x 3    Skin: Warm    Respiratory and Thorax: normal effort; Breath sounds: normal; No rales/wheezing/rhonchi  	  Cardiovascular: S1, S2, regular, NMBR	    Gastrointestinal: BS + x 4Q, nontender	    Genitourinary:  Bladder nondistended, nontender    Musculoskeletal:   General Right:   + muscle/joint tenderness,   normal tone, no joint swelling,   ROM: limited	    General Left:   no muscle/joint tenderness,   normal tone, no joint swelling,   ROM: full    Hip:  Right: Dressing CDI    Lower extrems:   Right: no calf tenderness              negative maury's sign               + pedal pulses    Left:   no calf tenderness              negative maury's sign               + pedal pulses                            10.5   9.12  )-----------( 161      ( 08 Jul 2018 06:48 )             30.1       07-08    133<L>  |  100  |  16  ----------------------------<  147<H>  3.8   |  25  |  0.71    Ca    8.1<L>      08 Jul 2018 06:48        PT/INR - ( 08 Jul 2018 06:48 )   PT: 17.9 sec;   INR: 1.64 ratio                                 11.1   9.76  )-----------( 213      ( 07 Jul 2018 05:28 )             32.4       07-07    137  |  101  |  17  ----------------------------<  161<H>  4.3   |  29  |  0.74    Ca    8.2<L>      07 Jul 2018 05:28        PT/INR - ( 07 Jul 2018 05:28 )   PT: 13.3 sec;   INR: 1.23 ratio         PTT - ( 06 Jul 2018 07:38 )  PTT:30.1 sec    PT/INR - ( 06 Jul 2018 07:38 )   PT: 11.6 sec;   INR: 1.07 ratio         PTT - ( 06 Jul 2018 07:38 )  PTT:30.1 sec				    MEDICATIONS  (STANDING):  ascorbic acid 500 milliGRAM(s) Oral two times a day  ceFAZolin   IVPB 2000 milliGRAM(s) IV Intermittent every 8 hours  docusate sodium 100 milliGRAM(s) Oral three times a day  folic acid 1 milliGRAM(s) Oral daily  heparin  Injectable 5000 Unit(s) SubCutaneous every 8 hours  lactated ringers. 1000 milliLiter(s) IV Continuous <Continuous>  multivitamin 1 Tablet(s) Oral daily  pantoprazole    Tablet 40 milliGRAM(s) Oral daily  polyethylene glycol 3350 17 Gram(s) Oral daily  warfarin 5 milliGRAM(s) Oral daily      Vital Signs Last 24 Hrs  T(C): 37.3 (07-07-18 @ 23:45), Max: 37.3 (07-07-18 @ 23:45)  T(F): 99.1 (07-07-18 @ 23:45), Max: 99.1 (07-07-18 @ 23:45)  HR: 93 (07-08-18 @ 06:21) (75 - 96)  BP: 123/57 (07-08-18 @ 06:21) (94/62 - 136/83)  BP(mean): 58 (07-07-18 @ 12:48) (58 - 58)  RR: 16 (07-08-18 @ 06:21) (16 - 16)  SpO2: 92% (07-08-18 @ 06:21) (92% - 96%)    DVT Prophylaxis:  LMWH                   (  )  Heparin SQ           (x  )  Coumadin             (x  )  Xarelto                  (  )  Eliquis                   (  )  Venodynes           ( x )  Ambulation          (x  )  UFH                       (  )  Contraindicated  (  )

## 2018-07-08 NOTE — PROGRESS NOTE ADULT - ASSESSMENT
This is a 73 year old male s/p right total hip replacement with high risk for VTE due to age, immobility, BMI, surgery; low bleeding risk.    Discussed the necessity of VTE prophylaxis with coumadin. Educated patient on INR, value, meaning, and effects medications and foods may have on it. Will further reinforce coumadin education and follow up on outpatient basis.     Plan:  ::Coumadin 5 mg PO daily x 4 weeks total adjust dose per INR  ::Lovenox 40mg SQ daily until INR 1.8 or >  ::Protonix 40 mg PO daily  ::Daily PT/INR  ::Daily CBC/BMP  ::Enc ambulation  ::Venwon    Will continue to follow.

## 2018-07-09 LAB
ANION GAP SERPL CALC-SCNC: 8 MMOL/L — SIGNIFICANT CHANGE UP (ref 5–17)
BUN SERPL-MCNC: 15 MG/DL — SIGNIFICANT CHANGE UP (ref 7–23)
CALCIUM SERPL-MCNC: 8.1 MG/DL — LOW (ref 8.5–10.1)
CHLORIDE SERPL-SCNC: 101 MMOL/L — SIGNIFICANT CHANGE UP (ref 96–108)
CO2 SERPL-SCNC: 28 MMOL/L — SIGNIFICANT CHANGE UP (ref 22–31)
CREAT SERPL-MCNC: 0.66 MG/DL — SIGNIFICANT CHANGE UP (ref 0.5–1.3)
GLUCOSE BLDC GLUCOMTR-MCNC: 139 MG/DL — HIGH (ref 70–99)
GLUCOSE BLDC GLUCOMTR-MCNC: 165 MG/DL — HIGH (ref 70–99)
GLUCOSE BLDC GLUCOMTR-MCNC: 172 MG/DL — HIGH (ref 70–99)
GLUCOSE BLDC GLUCOMTR-MCNC: 236 MG/DL — HIGH (ref 70–99)
GLUCOSE SERPL-MCNC: 138 MG/DL — HIGH (ref 70–99)
HCT VFR BLD CALC: 30.2 % — LOW (ref 39–50)
HGB BLD-MCNC: 10.4 G/DL — LOW (ref 13–17)
INR BLD: 1.64 RATIO — HIGH (ref 0.88–1.16)
MCHC RBC-ENTMCNC: 31.6 PG — SIGNIFICANT CHANGE UP (ref 27–34)
MCHC RBC-ENTMCNC: 34.4 GM/DL — SIGNIFICANT CHANGE UP (ref 32–36)
MCV RBC AUTO: 91.8 FL — SIGNIFICANT CHANGE UP (ref 80–100)
NRBC # BLD: 0 /100 WBCS — SIGNIFICANT CHANGE UP (ref 0–0)
PLATELET # BLD AUTO: 188 K/UL — SIGNIFICANT CHANGE UP (ref 150–400)
POTASSIUM SERPL-MCNC: 3.8 MMOL/L — SIGNIFICANT CHANGE UP (ref 3.5–5.3)
POTASSIUM SERPL-SCNC: 3.8 MMOL/L — SIGNIFICANT CHANGE UP (ref 3.5–5.3)
PROTHROM AB SERPL-ACNC: 17.9 SEC — HIGH (ref 9.8–12.7)
RBC # BLD: 3.29 M/UL — LOW (ref 4.2–5.8)
RBC # FLD: 12.5 % — SIGNIFICANT CHANGE UP (ref 10.3–14.5)
SODIUM SERPL-SCNC: 137 MMOL/L — SIGNIFICANT CHANGE UP (ref 135–145)
WBC # BLD: 9.52 K/UL — SIGNIFICANT CHANGE UP (ref 3.8–10.5)
WBC # FLD AUTO: 9.52 K/UL — SIGNIFICANT CHANGE UP (ref 3.8–10.5)

## 2018-07-09 PROCEDURE — 99233 SBSQ HOSP IP/OBS HIGH 50: CPT

## 2018-07-09 RX ORDER — ENOXAPARIN SODIUM 100 MG/ML
40 INJECTION SUBCUTANEOUS
Qty: 0 | Refills: 0 | DISCHARGE
Start: 2018-07-09

## 2018-07-09 RX ORDER — OXYCODONE HYDROCHLORIDE 5 MG/1
1 TABLET ORAL
Qty: 0 | Refills: 0 | DISCHARGE
Start: 2018-07-09

## 2018-07-09 RX ADMIN — Medication 25 MILLIGRAM(S): at 21:18

## 2018-07-09 RX ADMIN — Medication 1 MILLIGRAM(S): at 11:34

## 2018-07-09 RX ADMIN — Medication 650 MILLIGRAM(S): at 17:05

## 2018-07-09 RX ADMIN — OXYCODONE HYDROCHLORIDE 10 MILLIGRAM(S): 5 TABLET ORAL at 17:05

## 2018-07-09 RX ADMIN — Medication 25 MILLIGRAM(S): at 05:36

## 2018-07-09 RX ADMIN — ATORVASTATIN CALCIUM 20 MILLIGRAM(S): 80 TABLET, FILM COATED ORAL at 21:18

## 2018-07-09 RX ADMIN — SODIUM CHLORIDE 3 MILLILITER(S): 9 INJECTION INTRAMUSCULAR; INTRAVENOUS; SUBCUTANEOUS at 05:32

## 2018-07-09 RX ADMIN — OXYCODONE HYDROCHLORIDE 10 MILLIGRAM(S): 5 TABLET ORAL at 05:42

## 2018-07-09 RX ADMIN — Medication 500 MILLIGRAM(S): at 17:04

## 2018-07-09 RX ADMIN — TAMSULOSIN HYDROCHLORIDE 0.4 MILLIGRAM(S): 0.4 CAPSULE ORAL at 21:18

## 2018-07-09 RX ADMIN — Medication 100 MILLIGRAM(S): at 17:04

## 2018-07-09 RX ADMIN — OXYCODONE HYDROCHLORIDE 10 MILLIGRAM(S): 5 TABLET ORAL at 05:36

## 2018-07-09 RX ADMIN — Medication 500 MILLIGRAM(S): at 05:36

## 2018-07-09 RX ADMIN — PANTOPRAZOLE SODIUM 40 MILLIGRAM(S): 20 TABLET, DELAYED RELEASE ORAL at 11:34

## 2018-07-09 RX ADMIN — SODIUM CHLORIDE 3 MILLILITER(S): 9 INJECTION INTRAMUSCULAR; INTRAVENOUS; SUBCUTANEOUS at 11:34

## 2018-07-09 RX ADMIN — Medication 650 MILLIGRAM(S): at 00:58

## 2018-07-09 RX ADMIN — Medication 650 MILLIGRAM(S): at 05:36

## 2018-07-09 RX ADMIN — ENOXAPARIN SODIUM 40 MILLIGRAM(S): 100 INJECTION SUBCUTANEOUS at 11:34

## 2018-07-09 RX ADMIN — Medication 650 MILLIGRAM(S): at 12:19

## 2018-07-09 RX ADMIN — SODIUM CHLORIDE 3 MILLILITER(S): 9 INJECTION INTRAMUSCULAR; INTRAVENOUS; SUBCUTANEOUS at 21:38

## 2018-07-09 RX ADMIN — Medication 1: at 11:33

## 2018-07-09 RX ADMIN — POLYETHYLENE GLYCOL 3350 17 GRAM(S): 17 POWDER, FOR SOLUTION ORAL at 17:04

## 2018-07-09 RX ADMIN — Medication 1 TABLET(S): at 11:34

## 2018-07-09 RX ADMIN — Medication 650 MILLIGRAM(S): at 11:34

## 2018-07-09 RX ADMIN — FINASTERIDE 5 MILLIGRAM(S): 5 TABLET, FILM COATED ORAL at 11:34

## 2018-07-09 RX ADMIN — Medication 650 MILLIGRAM(S): at 00:18

## 2018-07-09 RX ADMIN — Medication 1: at 07:57

## 2018-07-09 RX ADMIN — Medication 2 GRAM(S): at 17:04

## 2018-07-09 RX ADMIN — Medication 25 MILLIGRAM(S): at 17:07

## 2018-07-09 RX ADMIN — Medication 650 MILLIGRAM(S): at 05:41

## 2018-07-09 RX ADMIN — Medication 2 GRAM(S): at 05:37

## 2018-07-09 RX ADMIN — Medication 650 MILLIGRAM(S): at 21:18

## 2018-07-09 NOTE — PROGRESS NOTE ADULT - SUBJECTIVE AND OBJECTIVE BOX
PCP- DR Smart    CC- s/p RT THR    HPI:  72yo/M with PMH non-obstructive CAD, BPH, Diabetes, hyperlipidemia, psoriasis, OA with prior LT TKR presented for elective RT THR. Patient has had long-standing OA affecting his ambulation, failed outpatient medical treatment and required surgery. Medical consult called for postop medical management    PMH- as above  PSH- LT THR  Soc hx- denies smoking, alcohol-socially  Fam hx- non-contributory    7/6/18- c/o lower abd pressure and inability to urinate  7/7/18 still getting straight cath. Slightly hypotensive earlier  7/8/18 s/p Flanagan. +constipated  7/9/18 doing good today, for [facility]    Review of system- All 10 systems reviewed and is as per HPI otherwise negative.     Vital Signs Last 24 Hrs  T(C): 36.8 (09 Jul 2018 11:41), Max: 38.2 (08 Jul 2018 23:28)  T(F): 98.3 (09 Jul 2018 11:41), Max: 100.7 (08 Jul 2018 23:28)  HR: 95 (09 Jul 2018 11:23) (95 - 104)  BP: 114/63 (09 Jul 2018 11:41) (100/69 - 114/63)  BP(mean): 75 (09 Jul 2018 11:41) (75 - 75)  RR: 15 (09 Jul 2018 11:41) (15 - 16)  SpO2: 98% (09 Jul 2018 11:41) (93% - 98%)    LABS:                                10.4   9.52  )-----------( 188      ( 09 Jul 2018 05:31 )             30.2     09 Jul 2018 05:31    137    |  101    |  15     ----------------------------<  138    3.8     |  28     |  0.66     Ca    8.1        09 Jul 2018 05:31  PT/INR - ( 09 Jul 2018 05:31 )   PT: 17.9 sec;   INR: 1.64 ratio      POCT Blood Glucose.: 172 mg/dL (09 Jul 2018 11:22)  POCT Blood Glucose.: 165 mg/dL (09 Jul 2018 07:50)  POCT Blood Glucose.: 153 mg/dL (08 Jul 2018 21:17)  POCT Blood Glucose.: 183 mg/dL (08 Jul 2018 16:57)    RADIOLOGY & ADDITIONAL TESTS:      PHYSICAL EXAM:  GENERAL: NAD, well-groomed, well-developed  HEAD:  Atraumatic, Normocephalic  EYES: EOMI, PERRLA, conjunctiva and sclera clear  HEENT: Moist mucous membranes  NECK: Supple, No JVD  NERVOUS SYSTEM:  Alert & Oriented X3, Motor Strength 5/5 B/L upper and lower extremities; DTRs 2+ intact and symmetric  CHEST/LUNG: Clear to auscultation bilaterally; No rales, rhonchi, wheezing, or rubs  HEART: Regular rate and rhythm; No murmurs, rubs, or gallops  ABDOMEN: Soft, Nontender, Nondistended; Bowel sounds present  GENITOURINARY- +distended bladder  EXTREMITIES:  2+ Peripheral Pulses, No clubbing, cyanosis, or edema  MUSCULOSKELTAL- RT hip dressing dry  SKIN-no rash, no lesion  CNS- alert, oriented X3, non focal     Daily Height in cm: 182.88 (06 Jul 2018 07:35)      MEDICATIONS  (STANDING):  acetaminophen   Tablet. 650 milliGRAM(s) Oral every 6 hours  ascorbic acid 500 milliGRAM(s) Oral two times a day  atorvastatin 20 milliGRAM(s) Oral at bedtime  bethanechol 25 milliGRAM(s) Oral every 8 hours  dextrose 5%. 1000 milliLiter(s) (50 mL/Hr) IV Continuous <Continuous>  dextrose 50% Injectable 12.5 Gram(s) IV Push once  dextrose 50% Injectable 25 Gram(s) IV Push once  dextrose 50% Injectable 25 Gram(s) IV Push once  docusate sodium 100 milliGRAM(s) Oral three times a day  finasteride 5 milliGRAM(s) Oral daily  folic acid 1 milliGRAM(s) Oral daily  heparin  Injectable 5000 Unit(s) SubCutaneous every 8 hours  insulin lispro (HumaLOG) corrective regimen sliding scale   SubCutaneous three times a day before meals  insulin lispro (HumaLOG) corrective regimen sliding scale   SubCutaneous at bedtime  multivitamin 1 Tablet(s) Oral daily  omega-3-Acid Ethyl Esters 2 Gram(s) Oral two times a day  oxyCODONE  ER Tablet 10 milliGRAM(s) Oral every 12 hours  pantoprazole    Tablet 40 milliGRAM(s) Oral daily  polyethylene glycol 3350 17 Gram(s) Oral daily  sodium chloride 0.9% lock flush 3 milliLiter(s) IV Push every 8 hours  tamsulosin 0.4 milliGRAM(s) Oral at bedtime  warfarin 5 milliGRAM(s) Oral daily    MEDICATIONS  (PRN):  acetaminophen   Tablet 650 milliGRAM(s) Oral every 6 hours PRN For Temp greater than 38 C (100.4 F)  acetaminophen   Tablet. 650 milliGRAM(s) Oral every 6 hours PRN headache  benzocaine 15 mG/menthol 3.6 mG Lozenge 1 Lozenge Oral every 3 hours PRN Sore Throat  dextrose 40% Gel 15 Gram(s) Oral once PRN Blood Glucose LESS THAN 70 milliGRAM(s)/deciliter  diphenhydrAMINE   Capsule 25 milliGRAM(s) Oral at bedtime PRN Insomnia  diphenhydrAMINE   Capsule 25 milliGRAM(s) Oral every 6 hours PRN Rash and/or Itching  glucagon  Injectable 1 milliGRAM(s) IntraMuscular once PRN Glucose LESS THAN 70 milligrams/deciliter  HYDROmorphone  Injectable 0.5 milliGRAM(s) IV Push every 3 hours PRN Severe Pain (7 - 10)  ondansetron Injectable 4 milliGRAM(s) IV Push every 6 hours PRN Nausea and/or Vomiting  oxyCODONE    IR 5 milliGRAM(s) Oral every 4 hours PRN Mild Pain (1 - 3)  oxyCODONE    IR 10 milliGRAM(s) Oral every 4 hours PRN Moderate Pain (4 - 6)  senna 2 Tablet(s) Oral at bedtime PRN Constipation    Assessment/Plan  #S/p RT THR  Ortho f/u appreciated  PT as tolerated  Monitor   AC by Coumadin  Pain meds prn  Bowel regimen  Incentive spirometry    #Urinary retention likely 2 to BPH s/p Flanagan placement  Already on Flomax and Proscar  Add Bethenechol  Will discharge to rehab with Flanagan for voiding trial towards the end of next week    #Constipation  Bowel reigmen    #Diabetes- ISS    #Hypotension  S/p fluid bolus  Improved    #Dispo- [facility] today with Flanagan for voiding trial in rehab

## 2018-07-09 NOTE — PROGRESS NOTE ADULT - SUBJECTIVE AND OBJECTIVE BOX
HPI:    Patient is a 73y old  Male who presents with a chief complaint of "I have pain to my right hip." (06 Jul 2018 07:44)    Consulted by Dr. Quintero for VTE prophylaxis, risk stratification, and anticoagulation management.    PAST MEDICAL & SURGICAL HISTORY:  Cataract of both eyes, unspecified cataract type  Spinal stenosis of lumbar region, unspecified whether neurogenic claudication present  Primary osteoarthritis of left knee: history of. knee replacement  Pain of both hip joints  Primary osteoarthritis of right hip  Urinary frequency  History of colon polyps  Tinnitus of both ears  CAD (coronary artery disease)  Hyperlipidemia  KENDRA (obstructive sleep apnea): does not use CPAP  BPH (benign prostatic hypertrophy)  Psoriasis  Sleep apnea: does not use device  Diabetes  H/O total knee replacement, left: 2016  CAD (coronary artery disease): cardiac cath 7/9/15 negative  Encounter for screening colonoscopy  S/P left knee arthroscopy    BMI: 31.1    CrCl: 128.7    Caprini VTE Risk Score: 8 (high)    IMPROVE Bleeding Risk Score: 2.5 (low)    Falls Risk:   High (x  )  Mod (  )  Low (  )    7/6: Patient seen at bedside in PACU, explained coumadin to patient- he thinks he was on for joint replacement in past- denies any issues with bleeding/clotting.   7/7: Patient seen at bedside- bad night with "no sleep" due to pain and positioning issues. Discussed Coumadin again today and provided with educational folder. INR today 1.23. Patient planning on being dc'ed to home.  7/8: Patient seen at bedside, discussed that he needs to increase mobility as he does live alone. INR today 1.64.  7/9: seen at bedside, without c/o  going to rehab today    FAMILY HISTORY:  Family history of cancer of GI tract (Father)  Family history of asthma (Mother)  Family history of arthritis (Mother)  Family history of heart failure (Mother)    Denies any personal or familial history of clotting or bleeding disorders.    Allergies    No Known Allergies    Intolerances    REVIEW OF SYSTEMS    (  )Fever	     (  )Constipation	(  )SOB				(  )Headache	(  )Dysuria  (  )Chills	     (  )Melena	(  )Dyspnea present on exertion	                    (  )Dizziness                    (  )Polyuria  (  )Nausea	     (  )Hematochezia	(  )Cough			                    (  )Syncope   	(  )Hematuria  (  )Vomiting    (  )Chest Pain	(  )Wheezing			(  )Weakness  (  )Diarrhea     (  )Palpitations	(  )Anorexia			(  )Myalgia    All other review of systems negative: Yes    PHYSICAL EXAM:    Constitutional: Appears Well    Neurological: A& O x 3    Skin: Warm    Respiratory and Thorax: normal effort; Breath sounds: normal; No rales/wheezing/rhonchi  	  Cardiovascular: S1, S2, regular, NMBR	    Gastrointestinal: BS + x 4Q, nontender	    Genitourinary:  Bladder nondistended, nontender    Musculoskeletal:   General Right:   + muscle/joint tenderness,   normal tone, no joint swelling,   ROM: limited	    General Left:   no muscle/joint tenderness,   normal tone, no joint swelling,   ROM: full    Hip:  Right: Dressing CDI    Lower extrems:   Right: no calf tenderness              negative maury's sign               + pedal pulses    Left:   no calf tenderness              negative maury's sign               + pedal pulses                          10.4   9.52  )-----------( 188      ( 09 Jul 2018 05:31 )             30.2       07-09    137  |  101  |  15  ----------------------------<  138<H>  3.8   |  28  |  0.66    Ca    8.1<L>      09 Jul 2018 05:31                                 10.5   9.12  )-----------( 161      ( 08 Jul 2018 06:48 )             30.1       07-08    133<L>  |  100  |  16  ----------------------------<  147<H>  3.8   |  25  |  0.71    Ca    8.1<L>      08 Jul 2018 06:48        PT/INR - ( 09 Jul 2018 05:31 )   PT: 17.9 sec;   INR: 1.64 ratio        PT/INR - ( 08 Jul 2018 06:48 )   PT: 17.9 sec;   INR: 1.64 ratio                                 11.1   9.76  )-----------( 213      ( 07 Jul 2018 05:28 )             32.4       07-07    137  |  101  |  17  ----------------------------<  161<H>  4.3   |  29  |  0.74    Ca    8.2<L>      07 Jul 2018 05:28        PT/INR - ( 07 Jul 2018 05:28 )   PT: 13.3 sec;   INR: 1.23 ratio         PTT - ( 06 Jul 2018 07:38 )  PTT:30.1 sec    PT/INR - ( 06 Jul 2018 07:38 )   PT: 11.6 sec;   INR: 1.07 ratio         PTT - ( 06 Jul 2018 07:38 )  PTT:30.1 sec				    MEDICATIONS  (STANDING):  ascorbic acid 500 milliGRAM(s) Oral two times a day  ceFAZolin   IVPB 2000 milliGRAM(s) IV Intermittent every 8 hours  docusate sodium 100 milliGRAM(s) Oral three times a day  folic acid 1 milliGRAM(s) Oral daily  heparin  Injectable 5000 Unit(s) SubCutaneous every 8 hours  lactated ringers. 1000 milliLiter(s) IV Continuous <Continuous>  multivitamin 1 Tablet(s) Oral daily  pantoprazole    Tablet 40 milliGRAM(s) Oral daily  polyethylene glycol 3350 17 Gram(s) Oral daily  warfarin 5 milliGRAM(s) Oral daily    Vital Signs Last 24 Hrs  T(C): 37.4 (07-09-18 @ 03:22), Max: 38.2 (07-08-18 @ 23:28)  T(F): 99.3 (07-09-18 @ 03:22), Max: 100.7 (07-08-18 @ 23:28)  HR: 102 (07-08-18 @ 23:28) (98 - 104)  BP: 108/66 (07-08-18 @ 23:28) (100/69 - 111/60)  BP(mean): --  RR: 16 (07-08-18 @ 23:28) (16 - 16)  SpO2: 93% (07-08-18 @ 23:28) (93% - 97%)      DVT Prophylaxis:  LMWH                   (  )  Heparin SQ           (x  )  Coumadin             (x  )  Xarelto                  (  )  Eliquis                   (  )  Venodynes           ( x )  Ambulation          (x  )  UFH                       (  )  Contraindicated  (  )

## 2018-07-09 NOTE — PROGRESS NOTE ADULT - ASSESSMENT
This is a 73 year old male s/p right total hip replacement with high risk for VTE due to age, immobility, BMI, surgery; low bleeding risk.    Discussed the necessity of VTE prophylaxis with coumadin. Educated patient on INR, value, meaning, and effects medications and foods may have on it. Will further reinforce coumadin education and follow up on outpatient basis.     Plan: rehab today  ::cont Coumadin 5 mg PO daily x 4 weeks total adjust dose per INR  ::cont Lovenox 40mg SQ daily until INR 1.8 or >  ::Protonix 40 mg PO daily  :  ::Enc ambulation  ::Naomi    Will continue to follow.

## 2018-07-09 NOTE — PROGRESS NOTE ADULT - SUBJECTIVE AND OBJECTIVE BOX
Patient seen and examined at bedside. No acute events overnight. Pain well controlled.    PHYSICAL EXAM:  Vital Signs Last 24 Hrs  T(C): 37.4 (09 Jul 2018 03:22), Max: 38.2 (08 Jul 2018 23:28)  T(F): 99.3 (09 Jul 2018 03:22), Max: 100.7 (08 Jul 2018 23:28)  HR: 102 (08 Jul 2018 23:28) (93 - 104)  BP: 108/66 (08 Jul 2018 23:28) (100/69 - 123/57)  RR: 16 (08 Jul 2018 23:28) (16 - 16)  SpO2: 93% (08 Jul 2018 23:28) (92% - 97%)    LABS:                        10.5   9.12  )-----------( 161      ( 08 Jul 2018 06:48 )             30.1     08 Jul 2018 06:48    133    |  100    |  16     ----------------------------<  147    3.8     |  25     |  0.71     Ca    8.1        08 Jul 2018 06:48      PT/INR - ( 08 Jul 2018 06:48 )   PT: 17.9 sec;   INR: 1.64 ratio         Gen: NAD  RLE: Dressing clean, dry, intact  SILT L3-S1  +DP  +EHL/FHL/TA/GS intact  Compartments soft and compressible  No calf tenderness Patient seen and examined at bedside. No acute events overnight. Pain well controlled.    PHYSICAL EXAM:  Vital Signs Last 24 Hrs  T(C): 37.4 (09 Jul 2018 03:22), Max: 38.2 (08 Jul 2018 23:28)  T(F): 99.3 (09 Jul 2018 03:22), Max: 100.7 (08 Jul 2018 23:28)  HR: 102 (08 Jul 2018 23:28) (93 - 104)  BP: 108/66 (08 Jul 2018 23:28) (100/69 - 123/57)  RR: 16 (08 Jul 2018 23:28) (16 - 16)  SpO2: 93% (08 Jul 2018 23:28) (92% - 97%)    Gen: NAD  RLE: Incisions healing well; No drainage or erythema  SILT L3-S1  +DP  +EHL/FHL/TA/GS intact  Compartments soft and compressible  No calf tenderness

## 2018-07-09 NOTE — PROGRESS NOTE ADULT - ASSESSMENT
A/P: 73M s/p R JOSEPHINE POD 3  Analgesia  DVT ppx  WBAT RLE  PT/OT  Incentive spirometry  F/U Labs  DC planning to [facility] today  Will discuss with attending, and advise if plan changes A/P: 73M s/p R JOSEPHINE POD 3  Analgesia  DVT ppx  WBAT RLE  PT/OT  Incentive spirometry  Dressing changed   F/U Labs  DC planning to [facility] today  Will discuss with attending, and advise if plan changes

## 2018-07-10 VITALS
HEART RATE: 105 BPM | OXYGEN SATURATION: 98 % | SYSTOLIC BLOOD PRESSURE: 122 MMHG | DIASTOLIC BLOOD PRESSURE: 65 MMHG | RESPIRATION RATE: 16 BRPM | TEMPERATURE: 99 F

## 2018-07-10 LAB
ANION GAP SERPL CALC-SCNC: 6 MMOL/L — SIGNIFICANT CHANGE UP (ref 5–17)
BUN SERPL-MCNC: 17 MG/DL — SIGNIFICANT CHANGE UP (ref 7–23)
CALCIUM SERPL-MCNC: 7.9 MG/DL — LOW (ref 8.5–10.1)
CHLORIDE SERPL-SCNC: 99 MMOL/L — SIGNIFICANT CHANGE UP (ref 96–108)
CO2 SERPL-SCNC: 27 MMOL/L — SIGNIFICANT CHANGE UP (ref 22–31)
CREAT SERPL-MCNC: 0.68 MG/DL — SIGNIFICANT CHANGE UP (ref 0.5–1.3)
GLUCOSE BLDC GLUCOMTR-MCNC: 196 MG/DL — HIGH (ref 70–99)
GLUCOSE BLDC GLUCOMTR-MCNC: 198 MG/DL — HIGH (ref 70–99)
GLUCOSE SERPL-MCNC: 165 MG/DL — HIGH (ref 70–99)
HCT VFR BLD CALC: 29.5 % — LOW (ref 39–50)
HGB BLD-MCNC: 10.1 G/DL — LOW (ref 13–17)
INR BLD: 1.68 RATIO — HIGH (ref 0.88–1.16)
MCHC RBC-ENTMCNC: 30.9 PG — SIGNIFICANT CHANGE UP (ref 27–34)
MCHC RBC-ENTMCNC: 34.2 GM/DL — SIGNIFICANT CHANGE UP (ref 32–36)
MCV RBC AUTO: 90.2 FL — SIGNIFICANT CHANGE UP (ref 80–100)
NRBC # BLD: 0 /100 WBCS — SIGNIFICANT CHANGE UP (ref 0–0)
PLATELET # BLD AUTO: 221 K/UL — SIGNIFICANT CHANGE UP (ref 150–400)
POTASSIUM SERPL-MCNC: 3.4 MMOL/L — LOW (ref 3.5–5.3)
POTASSIUM SERPL-SCNC: 3.4 MMOL/L — LOW (ref 3.5–5.3)
PROTHROM AB SERPL-ACNC: 18.3 SEC — HIGH (ref 9.8–12.7)
RBC # BLD: 3.27 M/UL — LOW (ref 4.2–5.8)
RBC # FLD: 12.2 % — SIGNIFICANT CHANGE UP (ref 10.3–14.5)
SODIUM SERPL-SCNC: 132 MMOL/L — LOW (ref 135–145)
WBC # BLD: 8.58 K/UL — SIGNIFICANT CHANGE UP (ref 3.8–10.5)
WBC # FLD AUTO: 8.58 K/UL — SIGNIFICANT CHANGE UP (ref 3.8–10.5)

## 2018-07-10 PROCEDURE — 99238 HOSP IP/OBS DSCHRG MGMT 30/<: CPT

## 2018-07-10 PROCEDURE — 99233 SBSQ HOSP IP/OBS HIGH 50: CPT

## 2018-07-10 RX ORDER — WARFARIN SODIUM 2.5 MG/1
7.5 TABLET ORAL DAILY
Qty: 0 | Refills: 0 | Status: DISCONTINUED | OUTPATIENT
Start: 2018-07-10 | End: 2018-07-10

## 2018-07-10 RX ORDER — POTASSIUM CHLORIDE 20 MEQ
20 PACKET (EA) ORAL ONCE
Qty: 0 | Refills: 0 | Status: COMPLETED | OUTPATIENT
Start: 2018-07-10 | End: 2018-07-10

## 2018-07-10 RX ADMIN — Medication 650 MILLIGRAM(S): at 05:38

## 2018-07-10 RX ADMIN — Medication 25 MILLIGRAM(S): at 13:19

## 2018-07-10 RX ADMIN — Medication 25 MILLIGRAM(S): at 05:37

## 2018-07-10 RX ADMIN — Medication 1: at 08:18

## 2018-07-10 RX ADMIN — PANTOPRAZOLE SODIUM 40 MILLIGRAM(S): 20 TABLET, DELAYED RELEASE ORAL at 11:37

## 2018-07-10 RX ADMIN — FINASTERIDE 5 MILLIGRAM(S): 5 TABLET, FILM COATED ORAL at 11:37

## 2018-07-10 RX ADMIN — POLYETHYLENE GLYCOL 3350 17 GRAM(S): 17 POWDER, FOR SOLUTION ORAL at 05:40

## 2018-07-10 RX ADMIN — SODIUM CHLORIDE 3 MILLILITER(S): 9 INJECTION INTRAMUSCULAR; INTRAVENOUS; SUBCUTANEOUS at 05:40

## 2018-07-10 RX ADMIN — Medication 1: at 11:37

## 2018-07-10 RX ADMIN — Medication 2 GRAM(S): at 05:38

## 2018-07-10 RX ADMIN — ENOXAPARIN SODIUM 40 MILLIGRAM(S): 100 INJECTION SUBCUTANEOUS at 11:36

## 2018-07-10 RX ADMIN — Medication 1 MILLIGRAM(S): at 11:37

## 2018-07-10 RX ADMIN — OXYCODONE HYDROCHLORIDE 10 MILLIGRAM(S): 5 TABLET ORAL at 05:39

## 2018-07-10 RX ADMIN — Medication 100 MILLIGRAM(S): at 13:19

## 2018-07-10 RX ADMIN — Medication 20 MILLIEQUIVALENT(S): at 11:37

## 2018-07-10 RX ADMIN — Medication 650 MILLIGRAM(S): at 11:38

## 2018-07-10 RX ADMIN — Medication 1 TABLET(S): at 11:37

## 2018-07-10 RX ADMIN — Medication 500 MILLIGRAM(S): at 05:38

## 2018-07-10 RX ADMIN — Medication 100 MILLIGRAM(S): at 05:40

## 2018-07-10 NOTE — PROGRESS NOTE ADULT - SUBJECTIVE AND OBJECTIVE BOX
Orthopedics     POD 4 Total Hip Arthroplasty  Pain is controlled. Pt feeling well. No nausea or vomiting. Has been OOB with PT.    Vital Signs Last 24 Hrs  T(C): 36.9 (07-10-18 @ 05:36), Max: 37.6 (07-10-18 @ 00:09)  T(F): 98.4 (07-10-18 @ 05:36), Max: 99.7 (07-10-18 @ 00:09)  HR: 87 (07-10-18 @ 05:36) (87 - 107)  BP: 135/69 (07-10-18 @ 05:36) (106/56 - 135/69)  BP(mean): 75 (07-09-18 @ 11:41) (75 - 75)  RR: 16 (07-10-18 @ 05:36) (15 - 16)  SpO2: 98% (07-10-18 @ 05:36) (95% - 99%)                        10.1   8.58  )-----------( 221      ( 10 Jul 2018 06:07 )             29.5     10 Jul 2018 06:07    132    |  99     |  17     ----------------------------<  165    3.4     |  27     |  0.68     Ca    7.9        10 Jul 2018 06:07      PT/INR - ( 10 Jul 2018 06:07 )   PT: 18.3 sec;   INR: 1.68 ratio           Exam:  NAD AAOx3  Wound without erythema or drainage  +EHL FHL TA GS  SILT toes 1-5  +DP  Calf Soft NT    A/P:  Stable POD 4  RIGHT Total Hip Arthroplasty  -Analgesia  -DVT PE ppx  -OOB PT posterior hip precautions  -Aquacel dressing is dry and clean  -DC planning for today Orthopedics     POD 4 Total Hip Arthroplasty  Pain is controlled. Pt feeling well. No nausea or vomiting. Has been OOB with PT.    Vital Signs Last 24 Hrs  T(C): 36.9 (07-10-18 @ 05:36), Max: 37.6 (07-10-18 @ 00:09)  T(F): 98.4 (07-10-18 @ 05:36), Max: 99.7 (07-10-18 @ 00:09)  HR: 87 (07-10-18 @ 05:36) (87 - 107)  BP: 135/69 (07-10-18 @ 05:36) (106/56 - 135/69)  BP(mean): 75 (07-09-18 @ 11:41) (75 - 75)  RR: 16 (07-10-18 @ 05:36) (15 - 16)  SpO2: 98% (07-10-18 @ 05:36) (95% - 99%)                        10.1   8.58  )-----------( 221      ( 10 Jul 2018 06:07 )             29.5     10 Jul 2018 06:07    132    |  99     |  17     ----------------------------<  165    3.4     |  27     |  0.68     Ca    7.9        10 Jul 2018 06:07      PT/INR - ( 10 Jul 2018 06:07 )   PT: 18.3 sec;   INR: 1.68 ratio           Exam:  NAD AAOx3  Wound without erythema or drainage  +EHL FHL TA GS  SILT toes 1-5  +DP  Calf Soft NT    A/P:  Stable POD 4  RIGHT Total Hip Arthroplasty  -Analgesia  -DVT PE ppx  -OOB PT posterior hip precautions  -Aquacel dressing is dry and clean  -DC with Masha, and to have voiding trial at rehab. I discussed this With Dr. Holt this AM.   -DC planning for today Orthopedics     POD 4 Total Hip Arthroplasty  Pain is controlled. Pt feeling well. No nausea or vomiting. Has been OOB with PT.    Vital Signs Last 24 Hrs  T(C): 36.9 (07-10-18 @ 05:36), Max: 37.6 (07-10-18 @ 00:09)  T(F): 98.4 (07-10-18 @ 05:36), Max: 99.7 (07-10-18 @ 00:09)  HR: 87 (07-10-18 @ 05:36) (87 - 107)  BP: 135/69 (07-10-18 @ 05:36) (106/56 - 135/69)  BP(mean): 75 (07-09-18 @ 11:41) (75 - 75)  RR: 16 (07-10-18 @ 05:36) (15 - 16)  SpO2: 98% (07-10-18 @ 05:36) (95% - 99%)                        10.1   8.58  )-----------( 221      ( 10 Jul 2018 06:07 )             29.5     10 Jul 2018 06:07    132    |  99     |  17     ----------------------------<  165    3.4     |  27     |  0.68     Ca    7.9        10 Jul 2018 06:07      PT/INR - ( 10 Jul 2018 06:07 )   PT: 18.3 sec;   INR: 1.68 ratio           Exam:  NAD AAOx3  Wound without erythema or drainage  +EHL FHL TA GS  SILT toes 1-5  +DP  Calf Soft NT    A/P:  Stable POD 4  RIGHT Total Hip Arthroplasty  -Analgesia  -DVT PE ppx  -OOB PT posterior hip precautions  -Aquacel dressing is dry and clean  -DC with Flanagan, and to have voiding trial at rehab. I discussed this With Dr. Holt this AM. I explained to pt.  -DC planning for today

## 2018-07-10 NOTE — PROGRESS NOTE ADULT - PROVIDER SPECIALTY LIST ADULT
Anticoag Management
Hospitalist
Orthopedics
Hospitalist

## 2018-07-10 NOTE — PROGRESS NOTE ADULT - SUBJECTIVE AND OBJECTIVE BOX
PCP- DR Smart    CC- s/p RT THR    HPI:  74yo/M with PMH non-obstructive CAD, BPH, Diabetes, hyperlipidemia, psoriasis, OA with prior LT TKR presented for elective RT THR. Patient has had long-standing OA affecting his ambulation, failed outpatient medical treatment and required surgery. Medical consult called for postop medical management. Hospital course notable for urinary retention requiring hansen.     7/10: Pt seen and examined this am. Felt well. Waiting for dc to rehab. No sob/chest pain. Hansen in place.    Review of system- All 10 systems reviewed and is as per HPI otherwise negative.     Vital Signs Last 24 Hrs  T(C): 37 (10 Jul 2018 10:57), Max: 37.6 (10 Jul 2018 00:09)  T(F): 98.6 (10 Jul 2018 10:57), Max: 99.7 (10 Jul 2018 00:09)  HR: 105 (10 Jul 2018 10:57) (87 - 107)  BP: 122/65 (10 Jul 2018 10:57) (106/56 - 135/69)  RR: 16 (10 Jul 2018 10:57) (16 - 16)  SpO2: 98% (10 Jul 2018 10:57) (95% - 99%)    PHYSICAL EXAM:  GENERAL: NAD, well-groomed, well-developed  HEAD:  Atraumatic, Normocephalic  EYES: EOMI, PERRLA, conjunctiva and sclera clear  HEENT: Moist mucous membranes  NECK: Supple, No JVD  NERVOUS SYSTEM:  Alert & Oriented X3, non focal.  CHEST/LUNG: Clear to auscultation bilaterally  HEART: S1, S2+ Regular rate and rhythm  ABDOMEN: Soft, Nontender, Nondistended; Bowel sounds present  GENITOURINARY- +hansen  EXTREMITIES:  2+ Peripheral Pulses, no edema.  MUSCULOSKELETAL- RT hip dressing dry  SKIN-no rash, no lesion    LABS:                        10.1   8.58  )-----------( 221      ( 10 Jul 2018 06:07 )             29.5     07-10    132<L>  |  99  |  17  ----------------------------<  165<H>  3.4<L>   |  27  |  0.68    Ca    7.9<L>      10 Jul 2018 06:07      PT/INR - ( 10 Jul 2018 06:07 )   PT: 18.3 sec;   INR: 1.68 ratio         MEDICATIONS  (STANDING):  ascorbic acid 500 milliGRAM(s) Oral two times a day  atorvastatin 20 milliGRAM(s) Oral at bedtime  bethanechol 25 milliGRAM(s) Oral every 8 hours  dextrose 5%. 1000 milliLiter(s) (50 mL/Hr) IV Continuous <Continuous>  dextrose 50% Injectable 12.5 Gram(s) IV Push once  dextrose 50% Injectable 25 Gram(s) IV Push once  dextrose 50% Injectable 25 Gram(s) IV Push once  docusate sodium 100 milliGRAM(s) Oral three times a day  enoxaparin Injectable 40 milliGRAM(s) SubCutaneous daily  finasteride 5 milliGRAM(s) Oral daily  folic acid 1 milliGRAM(s) Oral daily  insulin lispro (HumaLOG) corrective regimen sliding scale   SubCutaneous three times a day before meals  insulin lispro (HumaLOG) corrective regimen sliding scale   SubCutaneous at bedtime  multivitamin 1 Tablet(s) Oral daily  omega-3-Acid Ethyl Esters 2 Gram(s) Oral two times a day  pantoprazole    Tablet 40 milliGRAM(s) Oral daily  polyethylene glycol 3350 17 Gram(s) Oral two times a day  sodium chloride 0.9% lock flush 3 milliLiter(s) IV Push every 8 hours  tamsulosin 0.4 milliGRAM(s) Oral at bedtime  warfarin 7.5 milliGRAM(s) Oral daily    MEDICATIONS  (PRN):  acetaminophen   Tablet 650 milliGRAM(s) Oral every 6 hours PRN For Temp greater than 38 C (100.4 F)  acetaminophen   Tablet. 650 milliGRAM(s) Oral every 6 hours PRN headache  benzocaine 15 mG/menthol 3.6 mG Lozenge 1 Lozenge Oral every 3 hours PRN Sore Throat  bisacodyl Suppository 10 milliGRAM(s) Rectal daily PRN If no bowel movement by POD#2  dextrose 40% Gel 15 Gram(s) Oral once PRN Blood Glucose LESS THAN 70 milliGRAM(s)/deciliter  diphenhydrAMINE   Capsule 25 milliGRAM(s) Oral at bedtime PRN Insomnia  diphenhydrAMINE   Capsule 25 milliGRAM(s) Oral every 6 hours PRN Rash and/or Itching  glucagon  Injectable 1 milliGRAM(s) IntraMuscular once PRN Glucose LESS THAN 70 milligrams/deciliter  HYDROmorphone  Injectable 0.5 milliGRAM(s) IV Push every 3 hours PRN Severe Pain (7 - 10)  ondansetron Injectable 4 milliGRAM(s) IV Push every 6 hours PRN Nausea and/or Vomiting  oxyCODONE    IR 5 milliGRAM(s) Oral every 4 hours PRN Mild Pain (1 - 3)  oxyCODONE    IR 10 milliGRAM(s) Oral every 4 hours PRN Moderate Pain (4 - 6)  senna 2 Tablet(s) Oral at bedtime PRN Constipation    Assessment/Plan  #OA S/p RT THR  Ortho f/u appreciated  PT as tolerated  Monitor HH  AC by Coumadin  Pain meds prn  Bowel regimen  Incentive spirometry    #Urinary retention likely 2 to BPH s/p Hansen placement  Already on Flomax and Proscar  Added Bethenechol  Will discharge to rehab with Hansen for voiding trial this week.    #Constipation  Bowel regimen    #Diabetes- ISS    #Hypotension  S/p fluid bolus  Improved    #Dispo-   [facility] today with Hansen for voiding trial in rehab

## 2018-07-10 NOTE — PROGRESS NOTE ADULT - SUBJECTIVE AND OBJECTIVE BOX
HPI:    Patient is a 73y old  Male who presents with a chief complaint of "I have pain to my right hip." (06 Jul 2018 07:44)    Consulted by Dr. Quintero for VTE prophylaxis, risk stratification, and anticoagulation management.    PAST MEDICAL & SURGICAL HISTORY:  Cataract of both eyes, unspecified cataract type  Spinal stenosis of lumbar region, unspecified whether neurogenic claudication present  Primary osteoarthritis of left knee: history of. knee replacement  Pain of both hip joints  Primary osteoarthritis of right hip  Urinary frequency  History of colon polyps  Tinnitus of both ears  CAD (coronary artery disease)  Hyperlipidemia  KENDRA (obstructive sleep apnea): does not use CPAP  BPH (benign prostatic hypertrophy)  Psoriasis  Sleep apnea: does not use device  Diabetes  H/O total knee replacement, left: 2016  CAD (coronary artery disease): cardiac cath 7/9/15 negative  Encounter for screening colonoscopy  S/P left knee arthroscopy    BMI: 31.1    CrCl: 128.7    Caprini VTE Risk Score: 8 (high)    IMPROVE Bleeding Risk Score: 2.5 (low)    Falls Risk:   High (x  )  Mod (  )  Low (  )    7/6: Patient seen at bedside in PACU, explained coumadin to patient- he thinks he was on for joint replacement in past- denies any issues with bleeding/clotting.   7/7: Patient seen at bedside- bad night with "no sleep" due to pain and positioning issues. Discussed Coumadin again today and provided with educational folder. INR today 1.23. Patient planning on being dc'ed to home.  7/8: Patient seen at bedside, discussed that he needs to increase mobility as he does live alone. INR today 1.64.  7/9: seen at bedside, without c/o  going to rehab today  7/10  awaiting auth for rehab today    FAMILY HISTORY:  Family history of cancer of GI tract (Father)  Family history of asthma (Mother)  Family history of arthritis (Mother)  Family history of heart failure (Mother)    Denies any personal or familial history of clotting or bleeding disorders.    Allergies    No Known Allergies    Intolerances    REVIEW OF SYSTEMS    (  )Fever	     (  )Constipation	(  )SOB				(  )Headache	(  )Dysuria  (  )Chills	     (  )Melena	(  )Dyspnea present on exertion	                    (  )Dizziness                    (  )Polyuria  (  )Nausea	     (  )Hematochezia	(  )Cough			                    (  )Syncope   	(  )Hematuria  (  )Vomiting    (  )Chest Pain	(  )Wheezing			(  )Weakness  (  )Diarrhea     (  )Palpitations	(  )Anorexia			(  )Myalgia    All other review of systems negative: Yes    PHYSICAL EXAM:    Constitutional: Appears Well    Neurological: A& O x 3    Skin: Warm    Respiratory and Thorax: normal effort; Breath sounds: normal; No rales/wheezing/rhonchi  	  Cardiovascular: S1, S2, regular, NMBR	    Gastrointestinal: BS + x 4Q, nontender	    Genitourinary:  Bladder nondistended, nontender    Musculoskeletal:   General Right:   + muscle/joint tenderness,   normal tone, no joint swelling,   ROM: limited	    General Left:   no muscle/joint tenderness,   normal tone, no joint swelling,   ROM: full    Hip:  Right: Dressing CDI    Lower extrems:   Right: no calf tenderness              negative maury's sign               + pedal pulses    Left:   no calf tenderness              negative maury's sign               + pedal pulses                            10.1   8.58  )-----------( 221      ( 10 Jul 2018 06:07 )             29.5       07-10    132<L>  |  99  |  17  ----------------------------<  165<H>  3.4<L>   |  27  |  0.68    Ca    7.9<L>      10 Jul 2018 06:07                            10.4   9.52  )-----------( 188      ( 09 Jul 2018 05:31 )             30.2       07-09    137  |  101  |  15  ----------------------------<  138<H>  3.8   |  28  |  0.66    Ca    8.1<L>      09 Jul 2018 05:31                                 10.5   9.12  )-----------( 161      ( 08 Jul 2018 06:48 )             30.1       07-08    133<L>  |  100  |  16  ----------------------------<  147<H>  3.8   |  25  |  0.71    Ca    8.1<L>      08 Jul 2018 06:48          PT/INR - ( 10 Jul 2018 06:07 )   PT: 18.3 sec;   INR: 1.68 ratio           PT/INR - ( 09 Jul 2018 05:31 )   PT: 17.9 sec;   INR: 1.64 ratio        PT/INR - ( 08 Jul 2018 06:48 )   PT: 17.9 sec;   INR: 1.64 ratio                                 11.1   9.76  )-----------( 213      ( 07 Jul 2018 05:28 )             32.4       07-07    137  |  101  |  17  ----------------------------<  161<H>  4.3   |  29  |  0.74    Ca    8.2<L>      07 Jul 2018 05:28        PT/INR - ( 07 Jul 2018 05:28 )   PT: 13.3 sec;   INR: 1.23 ratio         PTT - ( 06 Jul 2018 07:38 )  PTT:30.1 sec    PT/INR - ( 06 Jul 2018 07:38 )   PT: 11.6 sec;   INR: 1.07 ratio         PTT - ( 06 Jul 2018 07:38 )  PTT:30.1 sec				    MEDICATIONS  (STANDING):  acetaminophen   Tablet. 650 milliGRAM(s) Oral every 6 hours  ascorbic acid 500 milliGRAM(s) Oral two times a day  atorvastatin 20 milliGRAM(s) Oral at bedtime  bethanechol 25 milliGRAM(s) Oral every 8 hours  dextrose 5%. 1000 milliLiter(s) IV Continuous <Continuous>  dextrose 50% Injectable 12.5 Gram(s) IV Push once  dextrose 50% Injectable 25 Gram(s) IV Push once  dextrose 50% Injectable 25 Gram(s) IV Push once  docusate sodium 100 milliGRAM(s) Oral three times a day  enoxaparin Injectable 40 milliGRAM(s) SubCutaneous daily  finasteride 5 milliGRAM(s) Oral daily  folic acid 1 milliGRAM(s) Oral daily  insulin lispro (HumaLOG) corrective regimen sliding scale   SubCutaneous three times a day before meals  insulin lispro (HumaLOG) corrective regimen sliding scale   SubCutaneous at bedtime  multivitamin 1 Tablet(s) Oral daily  omega-3-Acid Ethyl Esters 2 Gram(s) Oral two times a day  pantoprazole    Tablet 40 milliGRAM(s) Oral daily  polyethylene glycol 3350 17 Gram(s) Oral two times a day  potassium chloride    Tablet ER 20 milliEquivalent(s) Oral once  sodium chloride 0.9% lock flush 3 milliLiter(s) IV Push every 8 hours  tamsulosin 0.4 milliGRAM(s) Oral at bedtime  warfarin 7.5 milliGRAM(s) Oral daily      Vital Signs Last 24 Hrs  T(C): 36.9 (07-10-18 @ 05:36), Max: 37.6 (07-10-18 @ 00:09)  T(F): 98.4 (07-10-18 @ 05:36), Max: 99.7 (07-10-18 @ 00:09)  HR: 87 (07-10-18 @ 05:36) (87 - 107)  BP: 135/69 (07-10-18 @ 05:36) (106/56 - 135/69)  BP(mean): 75 (07-09-18 @ 11:41) (75 - 75)  RR: 16 (07-10-18 @ 05:36) (15 - 16)  SpO2: 98% (07-10-18 @ 05:36) (95% - 99%)      DVT Prophylaxis:  LMWH                   (  )  Heparin SQ           (x  )  Coumadin             (x  )  Xarelto                  (  )  Eliquis                   (  )  Venodynes           ( x )  Ambulation          (x  )  UFH                       (  )  Contraindicated  (  )

## 2018-07-10 NOTE — PROGRESS NOTE ADULT - ASSESSMENT
A/P: 73M s/p R JOSEPHINE POD4	  Analgesia  DVT ppx  WBAT RLE  PT/OT  Incentive spirometry  Dressing changed   F/U Labs  DC planning to [facility] today  Will discuss with attending, and advise if plan changes

## 2018-07-10 NOTE — PROGRESS NOTE ADULT - ASSESSMENT
This is a 73 year old male s/p right total hip replacement with high risk for VTE due to age, immobility, BMI, surgery; low bleeding risk.    Discussed the necessity of VTE prophylaxis with coumadin. Educated patient on INR, value, meaning, and effects medications and foods may have on it. Will further reinforce coumadin education and follow up on outpatient basis.     Plan: rehab today will f/u post rehab with anticoag services  ::INC Coumadin 7. 5 mg PO daily x 4 weeks total adjust dose per INR  ::cont Lovenox 40mg SQ daily until INR 1.8 or >  ::Protonix 40 mg PO daily  :  ::Enc ambulation  ::Naomi    Will continue to follow.

## 2018-07-11 LAB — SURGICAL PATHOLOGY FINAL REPORT - CH: SIGNIFICANT CHANGE UP

## 2018-07-13 DIAGNOSIS — Z79.84 LONG TERM (CURRENT) USE OF ORAL HYPOGLYCEMIC DRUGS: ICD-10-CM

## 2018-07-13 DIAGNOSIS — E11.9 TYPE 2 DIABETES MELLITUS WITHOUT COMPLICATIONS: ICD-10-CM

## 2018-07-13 DIAGNOSIS — M16.11 UNILATERAL PRIMARY OSTEOARTHRITIS, RIGHT HIP: ICD-10-CM

## 2018-07-13 DIAGNOSIS — K21.9 GASTRO-ESOPHAGEAL REFLUX DISEASE WITHOUT ESOPHAGITIS: ICD-10-CM

## 2018-07-13 DIAGNOSIS — K59.00 CONSTIPATION, UNSPECIFIED: ICD-10-CM

## 2018-07-13 DIAGNOSIS — R33.8 OTHER RETENTION OF URINE: ICD-10-CM

## 2018-07-13 DIAGNOSIS — E78.5 HYPERLIPIDEMIA, UNSPECIFIED: ICD-10-CM

## 2018-07-13 DIAGNOSIS — R39.14 FEELING OF INCOMPLETE BLADDER EMPTYING: ICD-10-CM

## 2018-07-13 DIAGNOSIS — I25.10 ATHEROSCLEROTIC HEART DISEASE OF NATIVE CORONARY ARTERY WITHOUT ANGINA PECTORIS: ICD-10-CM

## 2018-07-13 DIAGNOSIS — Z87.891 PERSONAL HISTORY OF NICOTINE DEPENDENCE: ICD-10-CM

## 2018-07-13 DIAGNOSIS — Z96.651 PRESENCE OF RIGHT ARTIFICIAL KNEE JOINT: ICD-10-CM

## 2018-07-13 DIAGNOSIS — N40.1 BENIGN PROSTATIC HYPERPLASIA WITH LOWER URINARY TRACT SYMPTOMS: ICD-10-CM

## 2018-07-13 DIAGNOSIS — I95.9 HYPOTENSION, UNSPECIFIED: ICD-10-CM

## 2018-07-18 ENCOUNTER — APPOINTMENT (OUTPATIENT)
Dept: DERMATOLOGY | Facility: CLINIC | Age: 73
End: 2018-07-18

## 2018-07-27 ENCOUNTER — APPOINTMENT (OUTPATIENT)
Dept: CARDIOLOGY | Facility: CLINIC | Age: 73
End: 2018-07-27
Payer: MEDICARE

## 2018-07-27 DIAGNOSIS — M16.11 UNILATERAL PRIMARY OSTEOARTHRITIS, RIGHT HIP: ICD-10-CM

## 2018-07-27 PROBLEM — M48.061 SPINAL STENOSIS, LUMBAR REGION WITHOUT NEUROGENIC CLAUDICATION: Chronic | Status: ACTIVE | Noted: 2018-06-20

## 2018-07-27 PROBLEM — H93.13 TINNITUS, BILATERAL: Chronic | Status: ACTIVE | Noted: 2018-06-20

## 2018-07-27 PROBLEM — Z86.010 PERSONAL HISTORY OF COLONIC POLYPS: Chronic | Status: ACTIVE | Noted: 2018-06-20

## 2018-07-27 PROBLEM — H26.9 UNSPECIFIED CATARACT: Chronic | Status: ACTIVE | Noted: 2018-06-20

## 2018-07-27 PROBLEM — M17.12 UNILATERAL PRIMARY OSTEOARTHRITIS, LEFT KNEE: Chronic | Status: ACTIVE | Noted: 2018-06-20

## 2018-07-27 PROBLEM — R35.0 FREQUENCY OF MICTURITION: Chronic | Status: ACTIVE | Noted: 2018-06-20

## 2018-07-27 PROBLEM — M25.551 PAIN IN RIGHT HIP: Chronic | Status: ACTIVE | Noted: 2018-06-20

## 2018-07-27 PROCEDURE — 85610 PROTHROMBIN TIME: CPT | Mod: QW

## 2018-07-27 PROCEDURE — 93793 ANTICOAG MGMT PT WARFARIN: CPT

## 2018-07-31 ENCOUNTER — APPOINTMENT (OUTPATIENT)
Dept: CARDIOLOGY | Facility: CLINIC | Age: 73
End: 2018-07-31
Payer: MEDICARE

## 2018-07-31 DIAGNOSIS — Z96.641 PRESENCE OF RIGHT ARTIFICIAL HIP JOINT: ICD-10-CM

## 2018-07-31 DIAGNOSIS — Z96.649 AFTERCARE FOLLOWING JOINT REPLACEMENT SURGERY: ICD-10-CM

## 2018-07-31 DIAGNOSIS — Z47.1 AFTERCARE FOLLOWING JOINT REPLACEMENT SURGERY: ICD-10-CM

## 2018-07-31 DIAGNOSIS — Z71.89 OTHER SPECIFIED COUNSELING: ICD-10-CM

## 2018-07-31 PROCEDURE — 93793 ANTICOAG MGMT PT WARFARIN: CPT

## 2018-07-31 PROCEDURE — 85610 PROTHROMBIN TIME: CPT | Mod: QW

## 2018-08-16 ENCOUNTER — APPOINTMENT (OUTPATIENT)
Dept: DERMATOLOGY | Facility: CLINIC | Age: 73
End: 2018-08-16
Payer: MEDICARE

## 2018-08-16 PROCEDURE — 99213 OFFICE O/P EST LOW 20 MIN: CPT

## 2018-08-16 RX ORDER — METHOTREXATE 2.5 MG/1
2.5 TABLET ORAL
Qty: 30 | Refills: 0 | Status: DISCONTINUED | COMMUNITY
Start: 2018-01-24 | End: 2018-08-16

## 2018-08-16 RX ORDER — METHOTREXATE 2.5 MG/1
2.5 TABLET ORAL
Qty: 20 | Refills: 0 | Status: DISCONTINUED | COMMUNITY
Start: 2017-10-25 | End: 2018-08-16

## 2018-08-16 RX ORDER — ASPIRIN 81 MG/1
81 TABLET, CHEWABLE ORAL
Refills: 0 | Status: DISCONTINUED | COMMUNITY
End: 2018-08-16

## 2018-08-16 RX ORDER — METHOTREXATE 2.5 MG/1
2.5 TABLET ORAL
Qty: 32 | Refills: 1 | Status: DISCONTINUED | COMMUNITY
Start: 2018-05-07 | End: 2018-08-16

## 2018-08-28 ENCOUNTER — RX RENEWAL (OUTPATIENT)
Age: 73
End: 2018-08-28

## 2018-09-06 ENCOUNTER — RX RENEWAL (OUTPATIENT)
Age: 73
End: 2018-09-06

## 2018-10-04 ENCOUNTER — APPOINTMENT (OUTPATIENT)
Dept: DERMATOLOGY | Facility: CLINIC | Age: 73
End: 2018-10-04

## 2018-10-10 ENCOUNTER — OTHER (OUTPATIENT)
Age: 73
End: 2018-10-10

## 2018-10-15 ENCOUNTER — APPOINTMENT (OUTPATIENT)
Dept: DERMATOLOGY | Facility: CLINIC | Age: 73
End: 2018-10-15
Payer: MEDICARE

## 2018-10-15 PROCEDURE — 99213 OFFICE O/P EST LOW 20 MIN: CPT

## 2018-10-16 ENCOUNTER — OTHER (OUTPATIENT)
Age: 73
End: 2018-10-16

## 2018-10-23 LAB
ALBUMIN SERPL ELPH-MCNC: 4.2 G/DL
ALP BLD-CCNC: 49 U/L
ALT SERPL-CCNC: 14 U/L
AST SERPL-CCNC: 13 U/L
BILIRUB DIRECT SERPL-MCNC: 0.1 MG/DL
BILIRUB INDIRECT SERPL-MCNC: 0.2 MG/DL
BILIRUB SERPL-MCNC: 0.2 MG/DL
HCV RNA SERPL NAA DL=5-ACNC: NOT DETECTED IU/ML
HCV RNA SERPL NAA+PROBE-LOG IU: NOT DETECTED LOGIU/ML
M TB IFN-G BLD-IMP: ABNORMAL
PROT SERPL-MCNC: 7.2 G/DL
QUANTIFERON TB PLUS MITOGEN MINUS NIL: 0.13 IU/ML
QUANTIFERON TB PLUS NIL: 0.03 IU/ML
QUANTIFERON TB PLUS TB1 MINUS NIL: 0 IU/ML
QUANTIFERON TB PLUS TB2 MINUS NIL: -0.01 IU/ML

## 2018-10-24 ENCOUNTER — APPOINTMENT (OUTPATIENT)
Dept: DERMATOLOGY | Facility: CLINIC | Age: 73
End: 2018-10-24
Payer: MEDICARE

## 2018-10-24 PROCEDURE — 86580 TB INTRADERMAL TEST: CPT

## 2018-10-26 ENCOUNTER — APPOINTMENT (OUTPATIENT)
Dept: DERMATOLOGY | Facility: CLINIC | Age: 73
End: 2018-10-26
Payer: MEDICARE

## 2018-10-26 PROCEDURE — 99211 OFF/OP EST MAY X REQ PHY/QHP: CPT | Mod: NC

## 2018-10-31 NOTE — PATIENT PROFILE ADULT. - PRO INTERPRETER NEED 2
RECEIVED REPORT. ASSUMED CARE OF PT AROUND 0730. PT A&O X4, OCCASIONALLY
FORGETFUL AND IMPULSIVE. VSS. O2 SAT >90% ON RA. CARDIAC MONITOR IN PLACE
TRACING SR. AM ASSESSMENT AND VITALS COMPLETED AS CHARTED. PT ANXIOUS TO GO
HOME. PT SEEN BY DR ARANA - TOENAILS TRIMMED. DISCHARGE ORDERS RECEIVED.
DISCHARGE COMPLETED AS CHARTED. DISCHARGE SUMMARY, CARE NOTES AND SCRIPTS GONE
OVER WITH PT. PT COMMUNICATES UNDERSTANDING. IV AND CARDIAC MONITOR REMOVED.
ALL BELONGINGS GATHERED AND SENT HOME WITH THE PT. [facility] AT DISCHARGE SCORE OF
1. PT LEFT UNIT IN WC WITH NURSING STAFF. PT LEFT HOSPITAL IN CAR WITH SON. English

## 2018-11-05 ENCOUNTER — RX RENEWAL (OUTPATIENT)
Age: 73
End: 2018-11-05

## 2018-11-08 RX ORDER — APREMILAST 30 MG/1
30 TABLET, FILM COATED ORAL
Refills: 0 | Status: DISCONTINUED | COMMUNITY
End: 2018-11-08

## 2018-11-09 RX ORDER — ADALIMUMAB 40MG/0.8ML
40 KIT SUBCUTANEOUS
Qty: 1 | Refills: 5 | Status: DISCONTINUED | COMMUNITY
Start: 2018-04-10 | End: 2018-08-16

## 2018-11-26 ENCOUNTER — RX RENEWAL (OUTPATIENT)
Age: 73
End: 2018-11-26

## 2018-11-26 RX ORDER — BETAMETHASONE DIPROPIONATE 0.5 MG/G
0.05 OINTMENT TOPICAL DAILY
Qty: 1 | Refills: 2 | Status: DISCONTINUED | COMMUNITY
Start: 2018-09-06 | End: 2018-11-26

## 2018-11-28 ENCOUNTER — RX RENEWAL (OUTPATIENT)
Age: 73
End: 2018-11-28

## 2018-12-03 ENCOUNTER — RX RENEWAL (OUTPATIENT)
Age: 73
End: 2018-12-03

## 2018-12-10 ENCOUNTER — APPOINTMENT (OUTPATIENT)
Dept: DERMATOLOGY | Facility: CLINIC | Age: 73
End: 2018-12-10
Payer: MEDICARE

## 2018-12-10 PROCEDURE — 96401 CHEMO ANTI-NEOPL SQ/IM: CPT

## 2018-12-10 RX ORDER — BRODALUMAB 210 MG/1
210 INJECTION SUBCUTANEOUS
Qty: 0 | Refills: 0 | Status: COMPLETED | OUTPATIENT
Start: 2018-12-10

## 2018-12-10 RX ADMIN — BRODALUMAB 0 MG/1.5ML: 210 INJECTION SUBCUTANEOUS at 00:00

## 2018-12-18 ENCOUNTER — APPOINTMENT (OUTPATIENT)
Dept: DERMATOLOGY | Facility: CLINIC | Age: 73
End: 2018-12-18
Payer: MEDICARE

## 2018-12-18 PROCEDURE — 96401 CHEMO ANTI-NEOPL SQ/IM: CPT

## 2018-12-18 RX ORDER — BRODALUMAB 210 MG/1
210 INJECTION SUBCUTANEOUS
Qty: 0 | Refills: 0 | Status: COMPLETED | OUTPATIENT
Start: 2018-12-18

## 2018-12-18 RX ADMIN — BRODALUMAB 0 MG/1.5ML: 210 INJECTION SUBCUTANEOUS at 00:00

## 2018-12-20 ENCOUNTER — APPOINTMENT (OUTPATIENT)
Dept: DERMATOLOGY | Facility: CLINIC | Age: 73
End: 2018-12-20

## 2018-12-27 ENCOUNTER — APPOINTMENT (OUTPATIENT)
Dept: DERMATOLOGY | Facility: CLINIC | Age: 73
End: 2018-12-27
Payer: MEDICARE

## 2018-12-27 PROCEDURE — 96401 CHEMO ANTI-NEOPL SQ/IM: CPT

## 2018-12-27 RX ORDER — BRODALUMAB 210 MG/1
210 INJECTION SUBCUTANEOUS
Qty: 0 | Refills: 0 | Status: COMPLETED | OUTPATIENT
Start: 2018-12-27

## 2018-12-27 RX ADMIN — BRODALUMAB 0 MG/1.5ML: 210 INJECTION SUBCUTANEOUS at 00:00

## 2019-01-15 ENCOUNTER — APPOINTMENT (OUTPATIENT)
Dept: DERMATOLOGY | Facility: CLINIC | Age: 74
End: 2019-01-15
Payer: MEDICARE

## 2019-01-15 VITALS — BODY MASS INDEX: 30.48 KG/M2 | WEIGHT: 230 LBS | HEIGHT: 73 IN

## 2019-01-15 PROCEDURE — 96401 CHEMO ANTI-NEOPL SQ/IM: CPT

## 2019-01-15 RX ADMIN — BRODALUMAB 0 MG/1.5ML: 210 INJECTION SUBCUTANEOUS at 00:00

## 2019-01-15 NOTE — HISTORY OF PRESENT ILLNESS
[FreeTextEntry1] : Psoriasis on Siliq. [de-identified] : Patient doing very well, very happy with tx.

## 2019-01-16 RX ORDER — BRODALUMAB 210 MG/1
210 INJECTION SUBCUTANEOUS
Qty: 0 | Refills: 0 | Status: COMPLETED | OUTPATIENT
Start: 2019-01-15

## 2019-01-29 ENCOUNTER — APPOINTMENT (OUTPATIENT)
Dept: DERMATOLOGY | Facility: CLINIC | Age: 74
End: 2019-01-29
Payer: MEDICARE

## 2019-01-29 PROCEDURE — 96401 CHEMO ANTI-NEOPL SQ/IM: CPT

## 2019-01-29 RX ORDER — HYDROXYZINE HYDROCHLORIDE 25 MG/1
25 TABLET ORAL
Refills: 0 | Status: DISCONTINUED | COMMUNITY
End: 2019-01-29

## 2019-01-29 RX ORDER — BRODALUMAB 210 MG/1
210 INJECTION SUBCUTANEOUS
Qty: 0 | Refills: 0 | Status: COMPLETED | OUTPATIENT
Start: 2019-01-29

## 2019-01-29 RX ORDER — HYDROCORTISONE VALERATE 2 MG/G
0.2 CREAM TOPICAL
Qty: 15 | Refills: 2 | Status: DISCONTINUED | COMMUNITY
Start: 2017-10-25 | End: 2019-01-29

## 2019-01-29 RX ADMIN — BRODALUMAB 0 MG/1.5ML: 210 INJECTION SUBCUTANEOUS at 00:00

## 2019-01-30 RX ORDER — BRODALUMAB 210 MG/1
210 INJECTION SUBCUTANEOUS
Qty: 6 | Refills: 0 | Status: DISCONTINUED | COMMUNITY
Start: 2018-11-28 | End: 2019-01-30

## 2019-02-14 ENCOUNTER — APPOINTMENT (OUTPATIENT)
Dept: DERMATOLOGY | Facility: CLINIC | Age: 74
End: 2019-02-14
Payer: MEDICARE

## 2019-02-14 PROCEDURE — 96401 CHEMO ANTI-NEOPL SQ/IM: CPT

## 2019-02-14 RX ORDER — BRODALUMAB 210 MG/1
210 INJECTION SUBCUTANEOUS
Qty: 0 | Refills: 0 | Status: COMPLETED | OUTPATIENT
Start: 2019-02-14

## 2019-02-14 RX ADMIN — BRODALUMAB 0 MG/1.5ML: 210 INJECTION SUBCUTANEOUS at 00:00

## 2019-02-22 ENCOUNTER — RX RENEWAL (OUTPATIENT)
Age: 74
End: 2019-02-22

## 2019-02-26 ENCOUNTER — RX RENEWAL (OUTPATIENT)
Age: 74
End: 2019-02-26

## 2019-03-05 ENCOUNTER — APPOINTMENT (OUTPATIENT)
Dept: DERMATOLOGY | Facility: CLINIC | Age: 74
End: 2019-03-05
Payer: MEDICARE

## 2019-03-05 PROCEDURE — 96401 CHEMO ANTI-NEOPL SQ/IM: CPT

## 2019-03-05 RX ORDER — BRODALUMAB 210 MG/1
210 INJECTION SUBCUTANEOUS
Qty: 0 | Refills: 0 | Status: COMPLETED | OUTPATIENT
Start: 2019-03-05

## 2019-03-05 RX ADMIN — BRODALUMAB 0 MG/1.5ML: 210 INJECTION SUBCUTANEOUS at 00:00

## 2019-03-05 NOTE — HISTORY OF PRESENT ILLNESS
[FreeTextEntry1] : Psoriasis, on Siliq. [de-identified] : Psoriasis doing very well, very happy with response.  Feels well today.

## 2019-03-05 NOTE — PHYSICAL EXAM
[FreeTextEntry3] : hyperpigmented patches of the shins, with erythematous excoriated papules of the distal lower legs.\par UE's and trunk DVW.

## 2019-03-05 NOTE — ASSESSMENT
[FreeTextEntry1] : Psoriasis\par continue Siliq as doing.\par Topical as needed for the legs - keep pruritus from flaring to avoid excoriations.\par \par f/u in 2 weeks.\par

## 2019-03-20 ENCOUNTER — APPOINTMENT (OUTPATIENT)
Dept: DERMATOLOGY | Facility: CLINIC | Age: 74
End: 2019-03-20
Payer: MEDICARE

## 2019-03-20 PROCEDURE — 96401 CHEMO ANTI-NEOPL SQ/IM: CPT

## 2019-03-20 RX ORDER — BRODALUMAB 210 MG/1
210 INJECTION SUBCUTANEOUS
Qty: 0 | Refills: 0 | Status: COMPLETED | OUTPATIENT
Start: 2019-03-20

## 2019-03-20 RX ADMIN — BRODALUMAB 0 MG/1.5ML: 210 INJECTION SUBCUTANEOUS at 00:00

## 2019-03-20 NOTE — HISTORY OF PRESENT ILLNESS
[FreeTextEntry1] : Psoriasis on Siliq. [de-identified] : Minor flare on the hands, right shin (markedly pruritic).  Left shin much improved.

## 2019-04-09 ENCOUNTER — APPOINTMENT (OUTPATIENT)
Dept: DERMATOLOGY | Facility: CLINIC | Age: 74
End: 2019-04-09
Payer: MEDICARE

## 2019-04-09 PROCEDURE — 96401 CHEMO ANTI-NEOPL SQ/IM: CPT

## 2019-04-09 RX ORDER — BRODALUMAB 210 MG/1
210 INJECTION SUBCUTANEOUS
Qty: 0 | Refills: 0 | Status: COMPLETED | OUTPATIENT
Start: 2019-04-09

## 2019-04-09 RX ADMIN — BRODALUMAB 0 MG/1.5ML: 210 INJECTION SUBCUTANEOUS at 00:00

## 2019-04-10 ENCOUNTER — RX RENEWAL (OUTPATIENT)
Age: 74
End: 2019-04-10

## 2019-04-22 ENCOUNTER — APPOINTMENT (OUTPATIENT)
Dept: DERMATOLOGY | Facility: CLINIC | Age: 74
End: 2019-04-22
Payer: MEDICARE

## 2019-04-22 VITALS — BODY MASS INDEX: 30.48 KG/M2 | HEIGHT: 73 IN | WEIGHT: 230 LBS

## 2019-04-22 PROCEDURE — 96401 CHEMO ANTI-NEOPL SQ/IM: CPT

## 2019-04-22 RX ORDER — BRODALUMAB 210 MG/1
210 INJECTION SUBCUTANEOUS
Qty: 0 | Refills: 0 | Status: COMPLETED | OUTPATIENT
Start: 2019-04-22

## 2019-04-22 RX ADMIN — BRODALUMAB 0 MG/1.5ML: 210 INJECTION SUBCUTANEOUS at 00:00

## 2019-04-22 NOTE — HISTORY OF PRESENT ILLNESS
[FreeTextEntry1] : Psoriasis on Siliq. [de-identified] : Flare on the dorsal hands over the past 2 weeks.

## 2019-04-23 ENCOUNTER — APPOINTMENT (OUTPATIENT)
Dept: DERMATOLOGY | Facility: CLINIC | Age: 74
End: 2019-04-23

## 2019-05-07 ENCOUNTER — APPOINTMENT (OUTPATIENT)
Dept: DERMATOLOGY | Facility: CLINIC | Age: 74
End: 2019-05-07

## 2019-05-07 ENCOUNTER — APPOINTMENT (OUTPATIENT)
Dept: DERMATOLOGY | Facility: CLINIC | Age: 74
End: 2019-05-07
Payer: MEDICARE

## 2019-05-07 PROCEDURE — 96401 CHEMO ANTI-NEOPL SQ/IM: CPT

## 2019-05-07 RX ORDER — BRODALUMAB 210 MG/1
210 INJECTION SUBCUTANEOUS
Qty: 0 | Refills: 0 | Status: COMPLETED | OUTPATIENT
Start: 2019-05-07

## 2019-05-07 RX ADMIN — BRODALUMAB 0 MG/1.5ML: 210 INJECTION SUBCUTANEOUS at 00:00

## 2019-05-21 ENCOUNTER — APPOINTMENT (OUTPATIENT)
Dept: DERMATOLOGY | Facility: CLINIC | Age: 74
End: 2019-05-21
Payer: MEDICARE

## 2019-05-21 PROCEDURE — 96401 CHEMO ANTI-NEOPL SQ/IM: CPT | Mod: 59

## 2019-05-21 PROCEDURE — 10060 I&D ABSCESS SIMPLE/SINGLE: CPT

## 2019-05-21 RX ORDER — BRODALUMAB 210 MG/1
210 INJECTION SUBCUTANEOUS
Qty: 0 | Refills: 0 | Status: COMPLETED | OUTPATIENT
Start: 2019-05-21

## 2019-05-21 RX ADMIN — BRODALUMAB 0 MG/1.5ML: 210 INJECTION SUBCUTANEOUS at 00:00

## 2019-05-21 NOTE — HISTORY OF PRESENT ILLNESS
[FreeTextEntry1] : Psoriasis - on Siliq. [de-identified] : Doing very well, happy with treatment.\par Feeling well today.\par Also with lesions on the neck and back.

## 2019-06-04 ENCOUNTER — APPOINTMENT (OUTPATIENT)
Dept: DERMATOLOGY | Facility: CLINIC | Age: 74
End: 2019-06-04
Payer: MEDICARE

## 2019-06-04 VITALS — WEIGHT: 230 LBS | BODY MASS INDEX: 30.48 KG/M2 | HEIGHT: 73 IN

## 2019-06-04 PROCEDURE — 96401 CHEMO ANTI-NEOPL SQ/IM: CPT

## 2019-06-04 RX ORDER — BRODALUMAB 210 MG/1
210 INJECTION SUBCUTANEOUS
Qty: 0 | Refills: 0 | Status: COMPLETED | OUTPATIENT
Start: 2019-06-04

## 2019-06-04 RX ADMIN — BRODALUMAB 0 MG/1.5ML: 210 INJECTION SUBCUTANEOUS at 00:00

## 2019-06-18 ENCOUNTER — APPOINTMENT (OUTPATIENT)
Dept: DERMATOLOGY | Facility: CLINIC | Age: 74
End: 2019-06-18
Payer: MEDICARE

## 2019-06-18 ENCOUNTER — LABORATORY RESULT (OUTPATIENT)
Age: 74
End: 2019-06-18

## 2019-06-18 PROCEDURE — 96401 CHEMO ANTI-NEOPL SQ/IM: CPT

## 2019-06-18 PROCEDURE — 12032 INTMD RPR S/A/T/EXT 2.6-7.5: CPT

## 2019-06-18 PROCEDURE — 11403 EXC TR-EXT B9+MARG 2.1-3CM: CPT

## 2019-06-18 RX ORDER — BRODALUMAB 210 MG/1
210 INJECTION SUBCUTANEOUS
Qty: 0 | Refills: 0 | Status: COMPLETED | OUTPATIENT
Start: 2019-06-18

## 2019-06-18 RX ADMIN — BRODALUMAB 0 MG/1.5ML: 210 INJECTION SUBCUTANEOUS at 00:00

## 2019-06-18 NOTE — HISTORY OF PRESENT ILLNESS
[FreeTextEntry1] : Psoriasis on Siliq. [de-identified] : Doing well, happy with progress.  Feeling well today.

## 2019-06-27 LAB — CORE LAB BIOPSY: NORMAL

## 2019-07-02 ENCOUNTER — APPOINTMENT (OUTPATIENT)
Dept: DERMATOLOGY | Facility: CLINIC | Age: 74
End: 2019-07-02
Payer: MEDICARE

## 2019-07-02 PROCEDURE — 96401 CHEMO ANTI-NEOPL SQ/IM: CPT

## 2019-07-02 RX ORDER — BRODALUMAB 210 MG/1
210 INJECTION SUBCUTANEOUS
Qty: 0 | Refills: 0 | Status: COMPLETED | OUTPATIENT
Start: 2019-07-02

## 2019-07-02 RX ADMIN — BRODALUMAB 0 MG/1.5ML: 210 INJECTION SUBCUTANEOUS at 00:00

## 2019-07-02 NOTE — HISTORY OF PRESENT ILLNESS
[FreeTextEntry1] : Suture removal. [de-identified] : Back is well healed, without evidence if infection.\par Sutures removed.  Dressed.\par Path confirms benign cyst.\par \par Psoriasis DW, feeling well today.

## 2019-07-18 ENCOUNTER — APPOINTMENT (OUTPATIENT)
Dept: DERMATOLOGY | Facility: CLINIC | Age: 74
End: 2019-07-18

## 2019-07-22 ENCOUNTER — APPOINTMENT (OUTPATIENT)
Dept: DERMATOLOGY | Facility: CLINIC | Age: 74
End: 2019-07-22
Payer: MEDICARE

## 2019-07-22 PROCEDURE — 96401 CHEMO ANTI-NEOPL SQ/IM: CPT

## 2019-07-22 RX ORDER — BRODALUMAB 210 MG/1
210 INJECTION SUBCUTANEOUS
Qty: 0 | Refills: 0 | Status: COMPLETED | OUTPATIENT
Start: 2019-07-22

## 2019-07-22 RX ADMIN — BRODALUMAB 0 MG/1.5ML: 210 INJECTION SUBCUTANEOUS at 00:00

## 2019-07-22 NOTE — HISTORY OF PRESENT ILLNESS
[FreeTextEntry1] : Psoriasis on Siliq. [de-identified] : Doing well, no complaints.  Left hand with discomfort, ? trigger finger (thumb).

## 2019-07-22 NOTE — ASSESSMENT
[FreeTextEntry1] : Psoriasis - DVW.  Continue as doing.\par \par Discomfort of the left hand - refer to Alec / [facility].

## 2019-08-05 ENCOUNTER — APPOINTMENT (OUTPATIENT)
Dept: DERMATOLOGY | Facility: CLINIC | Age: 74
End: 2019-08-05
Payer: MEDICARE

## 2019-08-05 PROCEDURE — 96401 CHEMO ANTI-NEOPL SQ/IM: CPT

## 2019-08-05 RX ORDER — BRODALUMAB 210 MG/1
210 INJECTION SUBCUTANEOUS
Qty: 0 | Refills: 0 | Status: COMPLETED | OUTPATIENT
Start: 2019-08-05

## 2019-08-05 RX ADMIN — BRODALUMAB 0 MG/1.5ML: 210 INJECTION SUBCUTANEOUS at 00:00

## 2019-08-05 NOTE — HISTORY OF PRESENT ILLNESS
[FreeTextEntry1] : Psoriasis on Siliq. [de-identified] : Psoriasis doing very well.\par Feels well today.

## 2019-08-19 ENCOUNTER — APPOINTMENT (OUTPATIENT)
Dept: DERMATOLOGY | Facility: CLINIC | Age: 74
End: 2019-08-19
Payer: MEDICARE

## 2019-08-19 PROCEDURE — 99213 OFFICE O/P EST LOW 20 MIN: CPT

## 2019-09-03 ENCOUNTER — APPOINTMENT (OUTPATIENT)
Dept: DERMATOLOGY | Facility: CLINIC | Age: 74
End: 2019-09-03
Payer: MEDICARE

## 2019-09-03 PROCEDURE — 96401 CHEMO ANTI-NEOPL SQ/IM: CPT

## 2019-09-03 RX ORDER — BRODALUMAB 210 MG/1
210 INJECTION SUBCUTANEOUS
Qty: 0 | Refills: 0 | Status: COMPLETED | OUTPATIENT
Start: 2019-09-03

## 2019-09-03 RX ADMIN — BRODALUMAB 0 MG/1.5ML: 210 INJECTION SUBCUTANEOUS at 00:00

## 2019-09-03 NOTE — PHYSICAL EXAM
[FreeTextEntry3] : erythematous scaling plaques, on the dorsal PIP's and DIP's of the bilateral hands.  Also on the knees.\par Patches with mild scale - entire right shin.

## 2019-09-03 NOTE — HISTORY OF PRESENT ILLNESS
[de-identified] : Notes persistent involvement of the dorsal hands, right shin. [FreeTextEntry1] : Psoriasis on Siliq.

## 2019-09-03 NOTE — ASSESSMENT
[FreeTextEntry1] : Psoriasis\par Will consider other options, as he is not having a complete response.\par \par Topicals and limited sun exposure encouraged.

## 2019-09-30 ENCOUNTER — APPOINTMENT (OUTPATIENT)
Dept: DERMATOLOGY | Facility: CLINIC | Age: 74
End: 2019-09-30
Payer: MEDICARE

## 2019-09-30 DIAGNOSIS — R21 RASH AND OTHER NONSPECIFIC SKIN ERUPTION: ICD-10-CM

## 2019-09-30 PROCEDURE — 99213 OFFICE O/P EST LOW 20 MIN: CPT

## 2019-10-07 ENCOUNTER — APPOINTMENT (OUTPATIENT)
Dept: DERMATOLOGY | Facility: CLINIC | Age: 74
End: 2019-10-07
Payer: MEDICARE

## 2019-10-07 PROCEDURE — 99213 OFFICE O/P EST LOW 20 MIN: CPT

## 2019-10-07 NOTE — ASSESSMENT
[FreeTextEntry1] : Psoriasis\par Will consider other options, as he is not having a complete response.\par \par Topicals and limited sun exposure encouraged.\par \par Siliq\par Lot Number [id number]\par Exp 5/21

## 2019-10-07 NOTE — HISTORY OF PRESENT ILLNESS
[FreeTextEntry1] : Psoriasis on Siliq. [de-identified] : Notes persistent involvement of the dorsal hands, right shin.\par infection on right lower leg resolved. swelling and warmth resolved.

## 2019-10-21 ENCOUNTER — APPOINTMENT (OUTPATIENT)
Dept: DERMATOLOGY | Facility: CLINIC | Age: 74
End: 2019-10-21
Payer: MEDICARE

## 2019-10-21 PROCEDURE — 99213 OFFICE O/P EST LOW 20 MIN: CPT

## 2019-11-04 ENCOUNTER — APPOINTMENT (OUTPATIENT)
Dept: DERMATOLOGY | Facility: CLINIC | Age: 74
End: 2019-11-04
Payer: MEDICARE

## 2019-11-04 PROCEDURE — 99213 OFFICE O/P EST LOW 20 MIN: CPT

## 2019-11-15 ENCOUNTER — APPOINTMENT (OUTPATIENT)
Dept: DERMATOLOGY | Facility: CLINIC | Age: 74
End: 2019-11-15

## 2019-11-29 ENCOUNTER — RX RENEWAL (OUTPATIENT)
Age: 74
End: 2019-11-29

## 2019-12-04 ENCOUNTER — APPOINTMENT (OUTPATIENT)
Dept: DERMATOLOGY | Facility: CLINIC | Age: 74
End: 2019-12-04
Payer: MEDICARE

## 2019-12-04 PROCEDURE — 96401 CHEMO ANTI-NEOPL SQ/IM: CPT

## 2019-12-04 RX ORDER — DICLOFENAC SODIUM 10 MG/G
1 GEL TOPICAL
Qty: 100 | Refills: 0 | Status: ACTIVE | COMMUNITY
Start: 2019-08-05

## 2019-12-04 RX ORDER — BRODALUMAB 210 MG/1
210 INJECTION SUBCUTANEOUS
Qty: 0 | Refills: 0 | Status: COMPLETED | OUTPATIENT
Start: 2019-12-04

## 2019-12-04 RX ORDER — CEPHALEXIN 500 MG/1
500 CAPSULE ORAL
Qty: 14 | Refills: 0 | Status: DISCONTINUED | COMMUNITY
Start: 2019-09-30 | End: 2019-12-04

## 2019-12-04 RX ADMIN — BRODALUMAB 0 MG/1.5ML: 210 INJECTION SUBCUTANEOUS at 00:00

## 2019-12-04 NOTE — ASSESSMENT
[FreeTextEntry1] : Psoriasis\par Education.\par continue siliq q 2 weeks.\par Emollients, including neut. norw. formula.

## 2019-12-04 NOTE — PHYSICAL EXAM
[FreeTextEntry3] : Erythematous patches, with fissures - hands.\par Shins with hyperemic patches with scale.

## 2019-12-04 NOTE — HISTORY OF PRESENT ILLNESS
[de-identified] : Patient to re-start siliq after being off for 1 month for surgery.  Surgeon happy with healing, and recommends restarting. [FreeTextEntry1] : Psoriasis.

## 2019-12-12 ENCOUNTER — APPOINTMENT (OUTPATIENT)
Dept: DERMATOLOGY | Facility: CLINIC | Age: 74
End: 2019-12-12
Payer: MEDICARE

## 2019-12-12 PROCEDURE — 96401 CHEMO ANTI-NEOPL SQ/IM: CPT

## 2019-12-12 RX ORDER — BRODALUMAB 210 MG/1
210 INJECTION SUBCUTANEOUS
Qty: 0 | Refills: 0 | Status: COMPLETED | OUTPATIENT
Start: 2019-12-12

## 2019-12-12 RX ADMIN — BRODALUMAB 0 MG/1.5ML: 210 INJECTION SUBCUTANEOUS at 00:00

## 2019-12-17 ENCOUNTER — APPOINTMENT (OUTPATIENT)
Dept: DERMATOLOGY | Facility: CLINIC | Age: 74
End: 2019-12-17

## 2019-12-30 ENCOUNTER — APPOINTMENT (OUTPATIENT)
Dept: DERMATOLOGY | Facility: CLINIC | Age: 74
End: 2019-12-30
Payer: MEDICARE

## 2019-12-30 PROCEDURE — 96401 CHEMO ANTI-NEOPL SQ/IM: CPT

## 2019-12-30 RX ORDER — BRODALUMAB 210 MG/1
210 INJECTION SUBCUTANEOUS
Qty: 0 | Refills: 0 | Status: COMPLETED | OUTPATIENT
Start: 2019-12-30

## 2019-12-30 RX ADMIN — BRODALUMAB 0 MG/1.5ML: 210 INJECTION SUBCUTANEOUS at 00:00

## 2020-01-17 ENCOUNTER — RX RENEWAL (OUTPATIENT)
Age: 75
End: 2020-01-17

## 2020-01-21 ENCOUNTER — APPOINTMENT (OUTPATIENT)
Dept: DERMATOLOGY | Facility: CLINIC | Age: 75
End: 2020-01-21
Payer: MEDICARE

## 2020-01-21 PROCEDURE — 10060 I&D ABSCESS SIMPLE/SINGLE: CPT

## 2020-01-21 PROCEDURE — 96401 CHEMO ANTI-NEOPL SQ/IM: CPT | Mod: 59

## 2020-01-21 RX ORDER — BRODALUMAB 210 MG/1
210 INJECTION SUBCUTANEOUS
Qty: 0 | Refills: 0 | Status: COMPLETED | OUTPATIENT
Start: 2020-01-21

## 2020-01-21 RX ADMIN — BRODALUMAB 0 MG/1.5ML: 210 INJECTION SUBCUTANEOUS at 00:00

## 2020-01-21 NOTE — ASSESSMENT
[FreeTextEntry1] : Psoriasis.\par Still with q 2 week txs.\par If persists, will consider alternatives, although insurance approval has been difficult in the past for this patient.\par \par

## 2020-01-21 NOTE — PHYSICAL EXAM
[Oriented x 3] : ~L oriented x 3 [Alert] : alert [Well Nourished] : well nourished [Eyelids] : Eyelids [Lips] : Lips [Ears] : Ears [FreeTextEntry3] : Erythematous patches with scale - bilateral elbows, dorsal fingers.\par \par Erythematous patch of the left dorsal wrist with two pustules and surfacing vicryl suture. [Neck] : Neck

## 2020-01-21 NOTE — HISTORY OF PRESENT ILLNESS
[de-identified] : Psoriasis flaring, 1 week late for Siliq. \par Also with left wrist lesions present since surgery to the site.  Some itching and tenderness.  No self tx. [FreeTextEntry1] : Psoriasis on Siliq, and left wrist lesion.

## 2020-02-03 ENCOUNTER — APPOINTMENT (OUTPATIENT)
Dept: DERMATOLOGY | Facility: CLINIC | Age: 75
End: 2020-02-03
Payer: MEDICARE

## 2020-02-03 PROCEDURE — 96401 CHEMO ANTI-NEOPL SQ/IM: CPT

## 2020-02-03 RX ORDER — BRODALUMAB 210 MG/1
210 INJECTION SUBCUTANEOUS
Qty: 0 | Refills: 0 | Status: COMPLETED | OUTPATIENT
Start: 2020-02-03

## 2020-02-03 RX ADMIN — BRODALUMAB 0 MG/1.5ML: 210 INJECTION SUBCUTANEOUS at 00:00

## 2020-02-03 NOTE — ASSESSMENT
[FreeTextEntry1] : Will consider skyrizi if can get coverage, given that patient is failing Siliq tx.

## 2020-02-18 ENCOUNTER — APPOINTMENT (OUTPATIENT)
Dept: DERMATOLOGY | Facility: CLINIC | Age: 75
End: 2020-02-18
Payer: MEDICARE

## 2020-02-18 PROCEDURE — 99213 OFFICE O/P EST LOW 20 MIN: CPT

## 2020-02-18 RX ORDER — BRODALUMAB 210 MG/1
210 INJECTION SUBCUTANEOUS
Qty: 0 | Refills: 0 | Status: COMPLETED | OUTPATIENT
Start: 2020-02-18

## 2020-02-18 RX ADMIN — BRODALUMAB 0 MG/1.5ML: 210 INJECTION SUBCUTANEOUS at 00:00

## 2020-02-18 NOTE — ASSESSMENT
[FreeTextEntry1] : Psoriasis\par Skyrizi tx still pending insurance approval.\par Continue Siliq for now, q 2 weeks.

## 2020-02-18 NOTE — HISTORY OF PRESENT ILLNESS
[FreeTextEntry1] : Psoriasis on Siliq. [de-identified] : Improved since last visit, 2 weeks ago, although still with significant involvement.  No itching or tender lesions.

## 2020-02-18 NOTE — PHYSICAL EXAM
[Alert] : alert [Oriented x 3] : ~L oriented x 3 [Well Nourished] : well nourished [Ears] : Ears [Eyelids] : Eyelids [Neck] : Neck [Lips] : Lips [FreeTextEntry3] : scaling patches of the dorsal MCP's and PIP's of the hands.

## 2020-03-03 ENCOUNTER — APPOINTMENT (OUTPATIENT)
Dept: DERMATOLOGY | Facility: CLINIC | Age: 75
End: 2020-03-03
Payer: MEDICARE

## 2020-03-03 PROCEDURE — 99213 OFFICE O/P EST LOW 20 MIN: CPT

## 2020-03-03 RX ORDER — BRODALUMAB 210 MG/1
210 INJECTION SUBCUTANEOUS
Qty: 0 | Refills: 0 | Status: COMPLETED | OUTPATIENT
Start: 2020-03-03

## 2020-03-03 RX ADMIN — BRODALUMAB 0 MG/1.5ML: 210 INJECTION SUBCUTANEOUS at 00:00

## 2020-03-03 NOTE — ASSESSMENT
TRANSFER - OUT REPORT:    Verbal report given to Marco Antonio Mcintyre on Georgette Lung  being transferred to ICU 11 for routine progression of care       Report consisted of patients Situation, Background, Assessment and   Recommendations(SBAR). Information from the following report(s) SBAR and Procedure Summary was reviewed with the receiving nurse. Lines:   Peripheral IV 07/19/19 Right Antecubital (Active)   Site Assessment Clean, dry, & intact 7/20/2019  8:00 AM   Phlebitis Assessment 0 7/20/2019  8:00 AM   Infiltration Assessment 0 7/20/2019  8:00 AM   Dressing Status Clean, dry, & intact 7/20/2019  8:00 AM   Dressing Type Transparent 7/20/2019  8:00 AM   Hub Color/Line Status Pink; Infusing 7/20/2019  8:00 AM   Action Taken Open ports on tubing capped 7/20/2019  8:00 AM   Alcohol Cap Used Yes 7/20/2019  8:00 AM       Peripheral IV 07/19/19 Left Antecubital (Active)   Site Assessment Clean, dry, & intact 7/20/2019  8:00 AM   Phlebitis Assessment 0 7/20/2019  8:00 AM   Infiltration Assessment 0 7/20/2019  8:00 AM   Dressing Status Clean, dry, & intact 7/20/2019  8:00 AM   Dressing Type Transparent 7/20/2019  8:00 AM   Hub Color/Line Status Pink; Infusing 7/20/2019  8:00 AM   Action Taken Open ports on tubing capped 7/20/2019  8:00 AM   Alcohol Cap Used Yes 7/20/2019  8:00 AM        Opportunity for questions and clarification was provided.       Patient transported with:   Monitor  O2 @ 2 liters  Registered Nurse  [facility] [FreeTextEntry1] : Psoriasis - continue on Siliq for now.\par Considering alternatives - possibly skyrizi pending insurance and costs.

## 2020-03-17 ENCOUNTER — APPOINTMENT (OUTPATIENT)
Dept: DERMATOLOGY | Facility: CLINIC | Age: 75
End: 2020-03-17

## 2020-03-17 ENCOUNTER — APPOINTMENT (OUTPATIENT)
Dept: DERMATOLOGY | Facility: CLINIC | Age: 75
End: 2020-03-17
Payer: MEDICARE

## 2020-03-17 PROCEDURE — 99213 OFFICE O/P EST LOW 20 MIN: CPT

## 2020-03-17 RX ORDER — BRODALUMAB 210 MG/1
210 INJECTION SUBCUTANEOUS
Qty: 0 | Refills: 0 | Status: COMPLETED | OUTPATIENT
Start: 2020-03-17

## 2020-03-17 RX ADMIN — BRODALUMAB 0 MG/1.5ML: 210 INJECTION SUBCUTANEOUS at 00:00

## 2020-03-31 ENCOUNTER — APPOINTMENT (OUTPATIENT)
Dept: DERMATOLOGY | Facility: CLINIC | Age: 75
End: 2020-03-31
Payer: MEDICARE

## 2020-03-31 VITALS — BODY MASS INDEX: 29.82 KG/M2 | WEIGHT: 225 LBS | HEIGHT: 73 IN

## 2020-03-31 PROCEDURE — 99213 OFFICE O/P EST LOW 20 MIN: CPT

## 2020-03-31 RX ORDER — CALCIPOTRIENE AND BETAMETHASONE DIPROPIONATE 50; .5 UG/G; MG/G
0.005-0.064 OINTMENT TOPICAL DAILY
Qty: 1 | Refills: 4 | Status: DISCONTINUED | COMMUNITY
Start: 2018-08-16 | End: 2020-03-31

## 2020-03-31 RX ORDER — BRODALUMAB 210 MG/1
210 INJECTION SUBCUTANEOUS
Qty: 0 | Refills: 0 | Status: COMPLETED | OUTPATIENT
Start: 2020-03-31

## 2020-03-31 RX ADMIN — BRODALUMAB 0 MG/1.5ML: 210 INJECTION SUBCUTANEOUS at 00:00

## 2020-03-31 NOTE — HISTORY OF PRESENT ILLNESS
[FreeTextEntry1] : Psoriasis, on Siliq. [de-identified] : Doing well, with moderate activity.  Some irritation, but minimal pruritus over the past couple of weeks.  no bleeding or tenderness.

## 2020-03-31 NOTE — ASSESSMENT
[FreeTextEntry1] : Psoriasis - stable at this time.\par Emollients.\par Continue Siliq, pending possible change to skyrizi.

## 2020-03-31 NOTE — PHYSICAL EXAM
[Alert] : alert [Oriented x 3] : ~L oriented x 3 [Well Nourished] : well nourished [Eyelids] : Eyelids [Ears] : Ears [Lips] : Lips [Neck] : Neck

## 2020-04-01 ENCOUNTER — RX RENEWAL (OUTPATIENT)
Age: 75
End: 2020-04-01

## 2020-04-14 ENCOUNTER — APPOINTMENT (OUTPATIENT)
Dept: DERMATOLOGY | Facility: CLINIC | Age: 75
End: 2020-04-14

## 2020-04-21 ENCOUNTER — APPOINTMENT (OUTPATIENT)
Dept: DERMATOLOGY | Facility: CLINIC | Age: 75
End: 2020-04-21
Payer: MEDICARE

## 2020-04-21 PROCEDURE — 99213 OFFICE O/P EST LOW 20 MIN: CPT

## 2020-04-21 RX ORDER — BRODALUMAB 210 MG/1
210 INJECTION SUBCUTANEOUS
Qty: 0 | Refills: 0 | Status: COMPLETED | OUTPATIENT
Start: 2020-04-21

## 2020-04-21 RX ADMIN — BRODALUMAB 0 MG/1.5ML: 210 INJECTION SUBCUTANEOUS at 00:00

## 2020-04-21 NOTE — PHYSICAL EXAM
[Alert] : alert [Oriented x 3] : ~L oriented x 3 [Well Nourished] : well nourished [Ears] : Ears [Eyelids] : Eyelids [Lips] : Lips [Neck] : Neck

## 2020-04-21 NOTE — HISTORY OF PRESENT ILLNESS
[FreeTextEntry1] : Psoriasis on siliq. [de-identified] : Doing well, without itching, or flares in his condition.  No bleeding or tender lesions.

## 2020-05-05 ENCOUNTER — APPOINTMENT (OUTPATIENT)
Dept: DERMATOLOGY | Facility: CLINIC | Age: 75
End: 2020-05-05
Payer: MEDICARE

## 2020-05-05 VITALS — WEIGHT: 225 LBS | HEIGHT: 73 IN | BODY MASS INDEX: 29.82 KG/M2

## 2020-05-05 PROCEDURE — 99213 OFFICE O/P EST LOW 20 MIN: CPT

## 2020-05-05 RX ORDER — BRODALUMAB 210 MG/1
210 INJECTION SUBCUTANEOUS
Qty: 0 | Refills: 0 | Status: COMPLETED | OUTPATIENT
Start: 2020-05-05

## 2020-05-05 RX ADMIN — BRODALUMAB 0 MG/1.5ML: 210 INJECTION SUBCUTANEOUS at 00:00

## 2020-05-05 NOTE — PHYSICAL EXAM
[Alert] : alert [Well Nourished] : well nourished [Oriented x 3] : ~L oriented x 3 [Ears] : Ears [Eyelids] : Eyelids [Lips] : Lips [Neck] : Neck

## 2020-05-05 NOTE — HISTORY OF PRESENT ILLNESS
[FreeTextEntry1] : Psoriasis on Siliq. [de-identified] : Doing well, without complaints of flaring.  No bleeding, no itching, no irritation.

## 2020-05-19 ENCOUNTER — APPOINTMENT (OUTPATIENT)
Dept: DERMATOLOGY | Facility: CLINIC | Age: 75
End: 2020-05-19
Payer: MEDICARE

## 2020-05-19 PROCEDURE — 99213 OFFICE O/P EST LOW 20 MIN: CPT

## 2020-05-19 RX ORDER — BRODALUMAB 210 MG/1
210 INJECTION SUBCUTANEOUS
Qty: 0 | Refills: 0 | Status: COMPLETED | OUTPATIENT
Start: 2020-05-19

## 2020-05-19 RX ADMIN — BRODALUMAB 0 MG/1.5ML: 210 INJECTION SUBCUTANEOUS at 00:00

## 2020-05-19 NOTE — HISTORY OF PRESENT ILLNESS
[FreeTextEntry1] : Psoriasis on Siliq. [de-identified] : Still with some pruritus of the shin, present for months.  Trunk and UE's, face, scalp - DVW.

## 2020-05-19 NOTE — PHYSICAL EXAM
[Alert] : alert [Oriented x 3] : ~L oriented x 3 [Eyelids] : Eyelids [Well Nourished] : well nourished [Lips] : Lips [Ears] : Ears [Neck] : Neck [FreeTextEntry3] : Hyperpigmented and erythematous patches, with some lichenification, few excoriations.

## 2020-05-19 NOTE — ASSESSMENT
[FreeTextEntry1] : Psoriasis\par DW on Siliq, but with some activity of the shins.\par Restart betamethasone and calcipotriene ointments.\par f/u in 2 weeks.

## 2020-06-01 RX ORDER — CALCIPOTRIENE 50 UG/G
0.01 OINTMENT TOPICAL TWICE DAILY
Qty: 1 | Refills: 3 | Status: DISCONTINUED | COMMUNITY
Start: 2018-09-06 | End: 2020-06-01

## 2020-06-02 ENCOUNTER — APPOINTMENT (OUTPATIENT)
Dept: DERMATOLOGY | Facility: CLINIC | Age: 75
End: 2020-06-02
Payer: MEDICARE

## 2020-06-02 PROCEDURE — 99213 OFFICE O/P EST LOW 20 MIN: CPT

## 2020-06-02 RX ORDER — BRODALUMAB 210 MG/1
210 INJECTION SUBCUTANEOUS
Qty: 0 | Refills: 0 | Status: COMPLETED | OUTPATIENT
Start: 2020-06-02

## 2020-06-02 RX ADMIN — BRODALUMAB 0 MG/1.5ML: 210 INJECTION SUBCUTANEOUS at 00:00

## 2020-06-02 NOTE — HISTORY OF PRESENT ILLNESS
[FreeTextEntry1] : Psoriasis on Siliq. [de-identified] : Patient doing well, without flares over the past 2 weeks.  No tenderness or itching.

## 2020-06-02 NOTE — PHYSICAL EXAM
[Alert] : alert [Oriented x 3] : ~L oriented x 3 [Well Nourished] : well nourished [Eyelids] : Eyelids [Lips] : Lips [Ears] : Ears [Neck] : Neck

## 2020-06-16 ENCOUNTER — APPOINTMENT (OUTPATIENT)
Dept: DERMATOLOGY | Facility: CLINIC | Age: 75
End: 2020-06-16
Payer: MEDICARE

## 2020-06-16 PROCEDURE — 99213 OFFICE O/P EST LOW 20 MIN: CPT

## 2020-06-16 RX ORDER — BRODALUMAB 210 MG/1
210 INJECTION SUBCUTANEOUS
Qty: 0 | Refills: 0 | Status: COMPLETED | OUTPATIENT
Start: 2020-06-16

## 2020-06-16 RX ADMIN — BRODALUMAB 0 MG/1.5ML: 210 INJECTION SUBCUTANEOUS at 00:00

## 2020-06-16 NOTE — ASSESSMENT
[FreeTextEntry1] : Psoriasis - DVW with siliq.\par Still some prurigo of the LE's.\par Topicals discussed  -  calcipotriene/betamethasone, self compounded daily.  use lidex ointment prn.\par f/u in 2 weeks.

## 2020-06-16 NOTE — PHYSICAL EXAM
[Alert] : alert [Well Nourished] : well nourished [Oriented x 3] : ~L oriented x 3 [Eyelids] : Eyelids [Ears] : Ears [Lips] : Lips [Neck] : Neck [FreeTextEntry3] : Hyperpigmented erythematous patch of the entire right anterolateral shin, and mild of the left anterolateral shin.  Few excoriations, and some papules.

## 2020-06-16 NOTE — HISTORY OF PRESENT ILLNESS
[FreeTextEntry1] : Psoriasis on Siliq. [de-identified] : Doing well, but with some persistent irritation and itching of the right > left shin over the past weeks and months.

## 2020-06-30 ENCOUNTER — APPOINTMENT (OUTPATIENT)
Dept: DERMATOLOGY | Facility: CLINIC | Age: 75
End: 2020-06-30
Payer: MEDICARE

## 2020-06-30 PROCEDURE — 99213 OFFICE O/P EST LOW 20 MIN: CPT

## 2020-06-30 RX ORDER — BRODALUMAB 210 MG/1
210 INJECTION SUBCUTANEOUS
Qty: 0 | Refills: 0 | Status: COMPLETED | OUTPATIENT
Start: 2020-06-30

## 2020-06-30 RX ADMIN — BRODALUMAB 0 MG/1.5ML: 210 INJECTION SUBCUTANEOUS at 00:00

## 2020-06-30 NOTE — PHYSICAL EXAM
[Oriented x 3] : ~L oriented x 3 [Alert] : alert [Well Nourished] : well nourished [Eyelids] : Eyelids [Ears] : Ears [Lips] : Lips [Neck] : Neck [FreeTextEntry3] : Minimal dz - LE's.

## 2020-06-30 NOTE — HISTORY OF PRESENT ILLNESS
[FreeTextEntry1] : Psoriasis on Siliq. [de-identified] : Doing well.  No flares.  No tender or itching regions over the past weeks.

## 2020-07-16 ENCOUNTER — APPOINTMENT (OUTPATIENT)
Dept: DERMATOLOGY | Facility: CLINIC | Age: 75
End: 2020-07-16
Payer: MEDICARE

## 2020-07-16 DIAGNOSIS — L70.0 ACNE VULGARIS: ICD-10-CM

## 2020-07-16 PROCEDURE — 99213 OFFICE O/P EST LOW 20 MIN: CPT

## 2020-07-16 RX ORDER — BRODALUMAB 210 MG/1
210 INJECTION SUBCUTANEOUS
Qty: 0 | Refills: 0 | Status: COMPLETED | OUTPATIENT
Start: 2020-07-16

## 2020-07-16 RX ADMIN — BRODALUMAB 0 MG/1.5ML: 210 INJECTION SUBCUTANEOUS at 00:00

## 2020-07-16 NOTE — HISTORY OF PRESENT ILLNESS
[FreeTextEntry1] : Psoriasis - on siliq. [de-identified] : DVW, without active dz currently.  Denies itching or irritation.\par Also noted lesion on the right forehead over the past months, increasing.  No bleeding, no tenderness.

## 2020-07-16 NOTE — PHYSICAL EXAM
[Alert] : alert [Oriented x 3] : ~L oriented x 3 [Well Nourished] : well nourished [Eyelids] : Eyelids [Ears] : Ears [Lips] : Lips [Neck] : Neck [FreeTextEntry3] : comedonal papules - right forehead.

## 2020-07-30 ENCOUNTER — APPOINTMENT (OUTPATIENT)
Dept: DERMATOLOGY | Facility: CLINIC | Age: 75
End: 2020-07-30
Payer: MEDICARE

## 2020-07-30 VITALS — WEIGHT: 225 LBS | BODY MASS INDEX: 29.82 KG/M2 | HEIGHT: 73 IN

## 2020-07-30 PROCEDURE — 99213 OFFICE O/P EST LOW 20 MIN: CPT

## 2020-07-30 RX ORDER — BRODALUMAB 210 MG/1
210 INJECTION SUBCUTANEOUS
Qty: 0 | Refills: 0 | Status: COMPLETED | OUTPATIENT
Start: 2020-07-30

## 2020-07-30 RX ADMIN — BRODALUMAB 0 MG/1.5ML: 210 INJECTION SUBCUTANEOUS at 00:00

## 2020-07-30 NOTE — PHYSICAL EXAM
[Alert] : alert [Well Nourished] : well nourished [Oriented x 3] : ~L oriented x 3 [Eyelids] : Eyelids [Lips] : Lips [Neck] : Neck [Ears] : Ears

## 2020-08-13 ENCOUNTER — APPOINTMENT (OUTPATIENT)
Dept: DERMATOLOGY | Facility: CLINIC | Age: 75
End: 2020-08-13
Payer: MEDICARE

## 2020-08-13 PROCEDURE — 99213 OFFICE O/P EST LOW 20 MIN: CPT

## 2020-08-13 NOTE — PHYSICAL EXAM
[Alert] : alert [Well Nourished] : well nourished [Oriented x 3] : ~L oriented x 3 [Eyelids] : Eyelids [Ears] : Ears [Lips] : Lips [Neck] : Neck [R Arm] : R Arm [Abdomen] : Abdomen [L Arm] : L Arm [FreeTextEntry3] : Psoriasis DVW.

## 2020-08-13 NOTE — HISTORY OF PRESENT ILLNESS
[FreeTextEntry1] : Psoriasis on siliq. [de-identified] : DVW, without flares over the past couple of weeks.  No itching or irritation.  Recently picked up betamethasone and calcipotriene ointments from [facility].

## 2020-08-17 RX ORDER — BRODALUMAB 210 MG/1
210 INJECTION SUBCUTANEOUS
Qty: 0 | Refills: 0 | Status: COMPLETED | OUTPATIENT
Start: 2020-08-17

## 2020-08-17 RX ADMIN — BRODALUMAB 0 MG/1.5ML: 210 INJECTION SUBCUTANEOUS at 00:00

## 2020-08-27 ENCOUNTER — APPOINTMENT (OUTPATIENT)
Dept: DERMATOLOGY | Facility: CLINIC | Age: 75
End: 2020-08-27
Payer: MEDICARE

## 2020-08-27 VITALS — HEIGHT: 73 IN | BODY MASS INDEX: 29.69 KG/M2 | WEIGHT: 224 LBS

## 2020-08-27 PROCEDURE — 99213 OFFICE O/P EST LOW 20 MIN: CPT

## 2020-08-27 RX ORDER — BRODALUMAB 210 MG/1
210 INJECTION SUBCUTANEOUS
Qty: 0 | Refills: 0 | Status: COMPLETED | OUTPATIENT
Start: 2020-08-27

## 2020-08-27 RX ADMIN — BRODALUMAB 0 MG/1.5ML: 210 INJECTION SUBCUTANEOUS at 00:00

## 2020-08-27 NOTE — ASSESSMENT
[FreeTextEntry1] : Psoriasis\par DVW - continue Siliq.\par \par inflamed Seb cyst - sternum.\par f/u for excision.

## 2020-08-27 NOTE — PHYSICAL EXAM
[Oriented x 3] : ~L oriented x 3 [Alert] : alert [Well Nourished] : well nourished [Eyelids] : Eyelids [Ears] : Ears [Neck] : Neck [Lips] : Lips [FreeTextEntry3] : Cystic nodule, sternum.\par Comedonal-papules - sternum and left lateral upper abdomen.

## 2020-08-27 NOTE — HISTORY OF PRESENT ILLNESS
[facility]  [address] [facility] [address]  [place], [address]  [phone] fax    Plan of Care/ Statement of Necessity for Physical Therapy Services    Patient name: Harjinder Pedraza Start of Care: 2019   Referral source: Amrik Becker MD : [id number]    Medical Diagnosis: Gait abnormality [R26.9]  [place]or: [place] MED[place] / Plan: [place] MEDICARE PART A & B / Product Type: Medicare /  Onset Date:2-3 years    Treatment Diagnosis: unsteady gait, RA    Prior Hospitalization: see medical history Provider#: [id number]   Medications: Verified on Patient summary List    Comorbidities: RA, carpal tunnel release   Prior Level of Function: functionally I with activities      The Plan of Care and following information is based on the information from the initial evaluation. Assessment/ key information: 74 y/o female presents with c/o stiffness in \"all her joints\", weakness and decreased balance. Pt demonstrates gait with over-pronation, genu valgus, decreased B step length, increased CANDIS. Pt demonstrates limited B LE strength and limited core strength. Decreased balance with rhomberg on Airex. She also reports difficulty with gait on unlevel surfaces. Pt will benefit from PT to address the aforementioned impairments. Evaluation Complexity History MEDIUM  Complexity : 1-2 comorbidities / personal factors will impact the outcome/ POC ; Examination MEDIUM Complexity : 3 Standardized tests and measures addressing body structure, function, activity limitation and / or participation in recreation  ;Presentation MEDIUM Complexity : Evolving with changing characteristics  ; Clinical Decision Making MEDIUM Complexity : FOTO score of 26-74  Overall Complexity Rating: MEDIUM  Problem List: decrease ROM, decrease strength, impaired gait/ balance, decrease ADL/ functional abilitiies, decrease activity tolerance, decrease flexibility/ joint mobility and decrease transfer [FreeTextEntry1] : Psoriasis on Siliq. abilities   Treatment Plan may include any combination of the following: Therapeutic exercise, Therapeutic activities, Neuromuscular re-education, Physical agent/modality, Gait/balance training, Manual therapy, Patient education and Self Care training  Patient / Family readiness to learn indicated by: asking questions, trying to perform skills and interest  Persons(s) to be included in education: patient (P)  Barriers to Learning/Limitations: None  Patient Goal (s): To be able to squat, get in and out of the bath and be more balanced  Patient Self Reported Health Status: fair  Rehabilitation Potential: good    Short Term Goals: To be accomplished in 1 weeks:   1. Pt will be I and compliant with HEP   Long Term Goals: To be accomplished in 4-8 weeks:   1. Improve FOTO to 55 to improve ability for daily tasks   2. Pt will perform rhomberg on airex x 30 sec without LOB to improve ability for daily tasks   3. Pt will report being able to climb several flight of stairs with a little difficulty or less. 4. Pt will demonstrates ability to transfer from floor to stand indepedently. Frequency / Duration: Patient to be seen 2 times per week for 8 weeks. Patient/ Caregiver education and instruction: Diagnosis, prognosis, self care, activity modification and exercises   [x]  Plan of care has been reviewed with PTA    Certification Period: 6-6-19 to 8-5-19  Celia Rosen, PT 6/6/2019 9:06 AM    ________________________________________________________________________    I certify that the above Therapy Services are being furnished while the patient is under my care. I agree with the treatment plan and certify that this therapy is necessary.     [de-identified] Signature:____________________  Date:____________Time: _________    Please sign and return to [facility] [address] [place] [address] & [facility]  [address] [facility] [address]  [place], [facility] [address]  [phone] fax [de-identified] : Psoriasis DVW.\par Also with lesion of the sternum, increasing in size, with some tenderness.  no bleeding or drainage.  no prior tx.

## 2020-09-10 ENCOUNTER — APPOINTMENT (OUTPATIENT)
Dept: DERMATOLOGY | Facility: CLINIC | Age: 75
End: 2020-09-10
Payer: MEDICARE

## 2020-09-10 VITALS — WEIGHT: 224 LBS | HEIGHT: 73 IN | BODY MASS INDEX: 29.69 KG/M2

## 2020-09-10 PROCEDURE — 12031 INTMD RPR S/A/T/EXT 2.5 CM/<: CPT | Mod: 59

## 2020-09-10 PROCEDURE — 17000 DESTRUCT PREMALG LESION: CPT | Mod: 59

## 2020-09-10 PROCEDURE — 10060 I&D ABSCESS SIMPLE/SINGLE: CPT | Mod: 59

## 2020-09-10 PROCEDURE — 11402 EXC TR-EXT B9+MARG 1.1-2 CM: CPT | Mod: 59

## 2020-09-10 PROCEDURE — 99212 OFFICE O/P EST SF 10 MIN: CPT | Mod: 25

## 2020-09-10 RX ORDER — BRODALUMAB 210 MG/1
210 INJECTION SUBCUTANEOUS
Qty: 0 | Refills: 0 | Status: COMPLETED | OUTPATIENT
Start: 2020-09-10

## 2020-09-10 RX ADMIN — BRODALUMAB 0 MG/1.5ML: 210 INJECTION SUBCUTANEOUS at 00:00

## 2020-09-16 LAB — CORE LAB BIOPSY: NORMAL

## 2020-09-24 ENCOUNTER — APPOINTMENT (OUTPATIENT)
Dept: DERMATOLOGY | Facility: CLINIC | Age: 75
End: 2020-09-24
Payer: MEDICARE

## 2020-09-24 VITALS — WEIGHT: 224 LBS | HEIGHT: 72 IN | BODY MASS INDEX: 30.34 KG/M2

## 2020-09-24 PROCEDURE — 99213 OFFICE O/P EST LOW 20 MIN: CPT

## 2020-09-24 RX ADMIN — BRODALUMAB 0 MG/1.5ML: 210 INJECTION SUBCUTANEOUS at 00:00

## 2020-09-24 NOTE — HISTORY OF PRESENT ILLNESS
[FreeTextEntry1] : Psoriasis on Siliq. [de-identified] : DVW, without flares over the past weeks.  no itching or tenderness.\par \par Also for suture removal. \par Sternum, well healed, without evidence infection.\par Sutures removed.\par path confirms benign cyst.

## 2020-09-24 NOTE — PHYSICAL EXAM
[Alert] : alert [Oriented x 3] : ~L oriented x 3 [Well Nourished] : well nourished [Nose] : Nose [Eyelids] : Eyelids [Ears] : Ears [Neck] : Neck [FreeTextEntry3] : hands, neck all doing well currently.

## 2020-09-30 RX ORDER — BRODALUMAB 210 MG/1
210 INJECTION SUBCUTANEOUS
Qty: 0 | Refills: 0 | Status: COMPLETED | OUTPATIENT
Start: 2020-09-24

## 2020-10-08 ENCOUNTER — APPOINTMENT (OUTPATIENT)
Dept: DERMATOLOGY | Facility: CLINIC | Age: 75
End: 2020-10-08
Payer: MEDICARE

## 2020-10-08 PROCEDURE — 99213 OFFICE O/P EST LOW 20 MIN: CPT

## 2020-10-08 RX ORDER — BRODALUMAB 210 MG/1
210 INJECTION SUBCUTANEOUS
Qty: 0 | Refills: 0 | Status: COMPLETED | OUTPATIENT
Start: 2020-10-08

## 2020-10-08 RX ADMIN — BRODALUMAB 0 MG/1.5ML: 210 INJECTION SUBCUTANEOUS at 00:00

## 2020-10-08 NOTE — PHYSICAL EXAM
[Alert] : alert [Oriented x 3] : ~L oriented x 3 [Well Nourished] : well nourished [Eyelids] : Eyelids [Ears] : Ears [Lips] : Lips [Neck] : Neck [R Arm] : R Arm [L Arm] : L Arm [R Leg] : R Leg [L Leg] : L Leg [FreeTextEntry3] : Psoriasis - well controlled.

## 2020-10-08 NOTE — HISTORY OF PRESENT ILLNESS
[FreeTextEntry1] : Psoriasis on Siliq. [de-identified] : Doing well, without flares over the past few weeks.  No pruritus or bleeding lesions.

## 2020-10-22 ENCOUNTER — APPOINTMENT (OUTPATIENT)
Dept: DERMATOLOGY | Facility: CLINIC | Age: 75
End: 2020-10-22
Payer: MEDICARE

## 2020-10-22 PROCEDURE — 99213 OFFICE O/P EST LOW 20 MIN: CPT

## 2020-10-22 RX ORDER — BRODALUMAB 210 MG/1
210 INJECTION SUBCUTANEOUS
Qty: 0 | Refills: 0 | Status: COMPLETED | OUTPATIENT
Start: 2020-10-22

## 2020-10-22 RX ADMIN — BRODALUMAB 0 MG/1.5ML: 210 INJECTION SUBCUTANEOUS at 00:00

## 2020-10-22 NOTE — ASSESSMENT
[FreeTextEntry1] : Psoriasis\par Possible psoriatic arthritis.\par Advil 400mg up to tid as needed.\par Follow up in 2 weeks.

## 2020-10-22 NOTE — HISTORY OF PRESENT ILLNESS
[FreeTextEntry1] : Psoriasis on Siliq. [de-identified] : Skin doing very well, but with some joint pain.  Left pointer MCP joint, exacerbating/remitting.  Left great toe, flares at night, ok during the day.

## 2020-10-22 NOTE — PHYSICAL EXAM
[Alert] : alert [Oriented x 3] : ~L oriented x 3 [Well Nourished] : well nourished [Nose] : Nose [Eyelids] : Eyelids [Ears] : Ears [Neck] : Neck [FreeTextEntry3] : Skin mostly clear.

## 2020-11-05 ENCOUNTER — APPOINTMENT (OUTPATIENT)
Dept: DERMATOLOGY | Facility: CLINIC | Age: 75
End: 2020-11-05
Payer: MEDICARE

## 2020-11-05 PROCEDURE — 99072 ADDL SUPL MATRL&STAF TM PHE: CPT

## 2020-11-05 PROCEDURE — 99213 OFFICE O/P EST LOW 20 MIN: CPT

## 2020-11-05 RX ORDER — BRODALUMAB 210 MG/1
210 INJECTION SUBCUTANEOUS
Qty: 0 | Refills: 0 | Status: COMPLETED | OUTPATIENT
Start: 2020-11-05

## 2020-11-05 RX ORDER — FLUOCINONIDE 0.05 MG/G
0.05 OINTMENT TOPICAL
Qty: 120 | Refills: 0 | Status: COMPLETED | COMMUNITY
Start: 2017-02-13 | End: 2020-11-05

## 2020-11-05 RX ADMIN — BRODALUMAB 0 MG/1.5ML: 210 INJECTION SUBCUTANEOUS at 00:00

## 2020-11-05 NOTE — HISTORY OF PRESENT ILLNESS
[FreeTextEntry1] : Psoriasis on Siliq. [de-identified] : Doing well.  Still with some pruritus of the shins.  No bleeding.  Using topical medications.  Otherwise doing very well over the past 2 weeks.

## 2020-11-05 NOTE — PHYSICAL EXAM
[Alert] : alert [Oriented x 3] : ~L oriented x 3 [Well Nourished] : well nourished [Declined] : declined [Nose] : Nose [Eyelids] : Eyelids [Ears] : Ears [Neck] : Neck [FreeTextEntry3] : Lichenified plaques of the shins.\par \par UE's and trunk clear.

## 2020-11-05 NOTE — ASSESSMENT
[FreeTextEntry1] : psoriasis\par Education.\par continue as doing with siliq.\par betamethasone for itching.  OK to use with calcipotriene.

## 2020-11-19 ENCOUNTER — APPOINTMENT (OUTPATIENT)
Dept: DERMATOLOGY | Facility: CLINIC | Age: 75
End: 2020-11-19
Payer: MEDICARE

## 2020-11-19 PROCEDURE — 99213 OFFICE O/P EST LOW 20 MIN: CPT

## 2020-11-19 RX ORDER — BRODALUMAB 210 MG/1
210 INJECTION SUBCUTANEOUS
Qty: 0 | Refills: 0 | Status: COMPLETED | OUTPATIENT
Start: 2020-11-19

## 2020-11-19 RX ADMIN — BRODALUMAB 0 MG/1.5ML: 210 INJECTION SUBCUTANEOUS at 00:00

## 2020-11-19 NOTE — PHYSICAL EXAM
[Alert] : alert [Oriented x 3] : ~L oriented x 3 [Well Nourished] : well nourished [Nose] : Nose [Eyelids] : Eyelids [Ears] : Ears [Neck] : Neck [FreeTextEntry3] : Currently nearly clear.

## 2020-11-19 NOTE — HISTORY OF PRESENT ILLNESS
[FreeTextEntry1] : Psoriasis on Siliq. [de-identified] : Doing very well without flares over the past weeks/ months.  No itching or tenderness.

## 2020-12-03 ENCOUNTER — APPOINTMENT (OUTPATIENT)
Dept: DERMATOLOGY | Facility: CLINIC | Age: 75
End: 2020-12-03
Payer: MEDICARE

## 2020-12-03 PROCEDURE — 99072 ADDL SUPL MATRL&STAF TM PHE: CPT

## 2020-12-03 PROCEDURE — 99213 OFFICE O/P EST LOW 20 MIN: CPT

## 2020-12-03 RX ORDER — BRODALUMAB 210 MG/1
210 INJECTION SUBCUTANEOUS
Qty: 0 | Refills: 0 | Status: COMPLETED | OUTPATIENT
Start: 2020-12-03

## 2020-12-03 RX ADMIN — BRODALUMAB 0 MG/1.5ML: 210 INJECTION SUBCUTANEOUS at 00:00

## 2020-12-03 NOTE — PHYSICAL EXAM
[Alert] : alert [Oriented x 3] : ~L oriented x 3 [Well Nourished] : well nourished [Declined] : declined [Nose] : Nose [Eyelids] : Eyelids [Ears] : Ears [Neck] : Neck

## 2020-12-03 NOTE — HISTORY OF PRESENT ILLNESS
[FreeTextEntry1] : Psoriasis on Siliq. [de-identified] : Doing well.  No flares over the past month.  No tenderness or itching.

## 2020-12-23 ENCOUNTER — APPOINTMENT (OUTPATIENT)
Dept: DERMATOLOGY | Facility: CLINIC | Age: 75
End: 2020-12-23
Payer: MEDICARE

## 2020-12-23 PROCEDURE — 99213 OFFICE O/P EST LOW 20 MIN: CPT

## 2020-12-23 PROCEDURE — 99072 ADDL SUPL MATRL&STAF TM PHE: CPT

## 2020-12-23 RX ORDER — BRODALUMAB 210 MG/1
210 INJECTION SUBCUTANEOUS
Qty: 0 | Refills: 0 | Status: COMPLETED | OUTPATIENT
Start: 2020-12-23

## 2020-12-23 RX ADMIN — BRODALUMAB 0 MG/1.5ML: 210 INJECTION SUBCUTANEOUS at 00:00

## 2020-12-23 NOTE — HISTORY OF PRESENT ILLNESS
[FreeTextEntry1] : Psoriasis on Siliq. [de-identified] : Doing very well, without flares over the past weeks.  No bleeding, tender or itching lesions.

## 2020-12-23 NOTE — PHYSICAL EXAM
[Alert] : alert [Oriented x 3] : ~L oriented x 3 [Well Nourished] : well nourished [Nose] : Nose [Eyelids] : Eyelids [Ears] : Ears [Neck] : Neck [FreeTextEntry3] : No evidence of psoriasis currently.

## 2021-01-07 ENCOUNTER — APPOINTMENT (OUTPATIENT)
Dept: DERMATOLOGY | Facility: CLINIC | Age: 76
End: 2021-01-07
Payer: MEDICARE

## 2021-01-07 VITALS — WEIGHT: 224 LBS | BODY MASS INDEX: 30.34 KG/M2 | HEIGHT: 72 IN

## 2021-01-07 PROCEDURE — 99072 ADDL SUPL MATRL&STAF TM PHE: CPT

## 2021-01-07 PROCEDURE — 99213 OFFICE O/P EST LOW 20 MIN: CPT

## 2021-01-07 RX ORDER — BRODALUMAB 210 MG/1
210 INJECTION SUBCUTANEOUS
Qty: 0 | Refills: 0 | Status: COMPLETED | OUTPATIENT
Start: 2021-01-07

## 2021-01-07 RX ADMIN — BRODALUMAB 0 MG/1.5ML: 210 INJECTION SUBCUTANEOUS at 00:00

## 2021-01-21 ENCOUNTER — NON-APPOINTMENT (OUTPATIENT)
Age: 76
End: 2021-01-21

## 2021-01-21 ENCOUNTER — APPOINTMENT (OUTPATIENT)
Dept: DERMATOLOGY | Facility: CLINIC | Age: 76
End: 2021-01-21
Payer: MEDICARE

## 2021-01-21 PROCEDURE — 99213 OFFICE O/P EST LOW 20 MIN: CPT

## 2021-01-21 PROCEDURE — 99072 ADDL SUPL MATRL&STAF TM PHE: CPT

## 2021-01-21 RX ORDER — BRODALUMAB 210 MG/1
210 INJECTION SUBCUTANEOUS
Qty: 0 | Refills: 0 | Status: COMPLETED | OUTPATIENT
Start: 2021-01-21

## 2021-01-21 RX ADMIN — BRODALUMAB 0 MG/1.5ML: 210 INJECTION SUBCUTANEOUS at 00:00

## 2021-02-08 ENCOUNTER — APPOINTMENT (OUTPATIENT)
Dept: DERMATOLOGY | Facility: CLINIC | Age: 76
End: 2021-02-08
Payer: MEDICARE

## 2021-02-08 PROCEDURE — 99072 ADDL SUPL MATRL&STAF TM PHE: CPT

## 2021-02-08 PROCEDURE — 99213 OFFICE O/P EST LOW 20 MIN: CPT

## 2021-02-08 RX ORDER — BLOOD SUGAR DIAGNOSTIC
STRIP MISCELLANEOUS
Qty: 100 | Refills: 0 | Status: ACTIVE | COMMUNITY
Start: 2020-10-22

## 2021-02-08 RX ORDER — BRODALUMAB 210 MG/1
210 INJECTION SUBCUTANEOUS
Qty: 0 | Refills: 0 | Status: COMPLETED | OUTPATIENT
Start: 2021-02-08

## 2021-02-08 RX ORDER — BLOOD-GLUCOSE METER
W/DEVICE EACH MISCELLANEOUS
Qty: 1 | Refills: 0 | Status: ACTIVE | COMMUNITY
Start: 2020-10-21

## 2021-02-08 RX ORDER — SODIUM SULFATE, POTASSIUM SULFATE, MAGNESIUM SULFATE 17.5; 3.13; 1.6 G/ML; G/ML; G/ML
17.5-3.13-1.6 SOLUTION, CONCENTRATE ORAL
Qty: 354 | Refills: 0 | Status: DISCONTINUED | COMMUNITY
Start: 2020-08-19 | End: 2021-02-08

## 2021-02-08 RX ORDER — LANCETS
EACH MISCELLANEOUS
Qty: 100 | Refills: 0 | Status: ACTIVE | COMMUNITY
Start: 2020-10-22

## 2021-02-08 RX ADMIN — BRODALUMAB 0 MG/1.5ML: 210 INJECTION SUBCUTANEOUS at 00:00

## 2021-02-08 NOTE — HISTORY OF PRESENT ILLNESS
[FreeTextEntry1] : Psoriasis on Siliq. [de-identified] : He notes marked tenderness and irritation of the right > left hand, exacerbating/remitting, but more severe over the past 2 weeks.

## 2021-02-08 NOTE — ASSESSMENT
[FreeTextEntry1] : Psoriasis with probable psoriatic arthritis\par Education.\par Advil prn.\par If persists, will refer to Rheumatology.\par f/u in 2 weeks.

## 2021-02-08 NOTE — PHYSICAL EXAM
[Alert] : alert [Oriented x 3] : ~L oriented x 3 [Well Nourished] : well nourished [Declined] : declined [FreeTextEntry3] : Currently without joint swelling of the hands.  Tenderness of the right MCP's.

## 2021-02-25 ENCOUNTER — APPOINTMENT (OUTPATIENT)
Dept: DERMATOLOGY | Facility: CLINIC | Age: 76
End: 2021-02-25
Payer: MEDICARE

## 2021-02-25 PROCEDURE — 99072 ADDL SUPL MATRL&STAF TM PHE: CPT

## 2021-02-25 PROCEDURE — 99213 OFFICE O/P EST LOW 20 MIN: CPT

## 2021-02-25 RX ORDER — BRODALUMAB 210 MG/1
210 INJECTION SUBCUTANEOUS
Qty: 0 | Refills: 0 | Status: COMPLETED | OUTPATIENT
Start: 2021-02-25

## 2021-02-25 RX ADMIN — BRODALUMAB 0 MG/1.5ML: 210 INJECTION SUBCUTANEOUS at 00:00

## 2021-02-25 NOTE — ASSESSMENT
[FreeTextEntry1] : Psoriasis\par Education.\par Continue q 2 week injections.\par Emollients.\par Topicals prn.

## 2021-03-11 ENCOUNTER — APPOINTMENT (OUTPATIENT)
Dept: DERMATOLOGY | Facility: CLINIC | Age: 76
End: 2021-03-11
Payer: MEDICARE

## 2021-03-11 DIAGNOSIS — L57.0 ACTINIC KERATOSIS: ICD-10-CM

## 2021-03-11 PROCEDURE — 17000 DESTRUCT PREMALG LESION: CPT

## 2021-03-11 PROCEDURE — 99213 OFFICE O/P EST LOW 20 MIN: CPT | Mod: 25

## 2021-03-11 PROCEDURE — 99072 ADDL SUPL MATRL&STAF TM PHE: CPT

## 2021-03-11 RX ORDER — BRODALUMAB 210 MG/1
210 INJECTION SUBCUTANEOUS
Qty: 0 | Refills: 0 | Status: COMPLETED | OUTPATIENT
Start: 2021-03-11

## 2021-03-11 RX ADMIN — BRODALUMAB 0 MG/1.5ML: 210 INJECTION SUBCUTANEOUS at 00:00

## 2021-03-11 NOTE — ASSESSMENT
[FreeTextEntry1] : Psoriasis\par Education - discussed at length.\par Patient s/p COVID vaccination x 2.\par Emollients.\par Siliq 210 mg q 2 weeks.

## 2021-03-15 NOTE — PATIENT PROFILE ADULT. - FUNCTIONAL SCREEN CURRENT LEVEL: SWALLOWING (IF SCORE 2 OR MORE FOR ANY ITEM, CONSULT REHAB SERVICES), MLM)
Dx: Sciatica of right side (M54.31)         Insurance (Authorized # of Visits):  8 per POC           Authorizing Physician: Dr. Liana Olivares  Next MD visit: none scheduled  Fall Risk: standard         Precautions: n/a             Subjective: Lower back has got independent and compliant with comprehensive HEP to maintain progress achieved in PT       Plan: continue with lumbar stabilization; try taping for hyperextension; gluteal strengthening  Date: 3/15/2021  TX#: 2/8 Date:                 TX#: 3/ Date: (0) swallows foods/liquids without difficulty

## 2021-03-29 ENCOUNTER — APPOINTMENT (OUTPATIENT)
Dept: DERMATOLOGY | Facility: CLINIC | Age: 76
End: 2021-03-29

## 2021-03-29 ENCOUNTER — APPOINTMENT (OUTPATIENT)
Dept: DERMATOLOGY | Facility: CLINIC | Age: 76
End: 2021-03-29
Payer: MEDICARE

## 2021-03-29 PROCEDURE — 99213 OFFICE O/P EST LOW 20 MIN: CPT

## 2021-03-29 PROCEDURE — 99072 ADDL SUPL MATRL&STAF TM PHE: CPT

## 2021-04-12 ENCOUNTER — APPOINTMENT (OUTPATIENT)
Dept: DERMATOLOGY | Facility: CLINIC | Age: 76
End: 2021-04-12
Payer: MEDICARE

## 2021-04-12 PROCEDURE — 99213 OFFICE O/P EST LOW 20 MIN: CPT

## 2021-04-12 PROCEDURE — 99072 ADDL SUPL MATRL&STAF TM PHE: CPT

## 2021-04-12 RX ORDER — BRODALUMAB 210 MG/1
210 INJECTION SUBCUTANEOUS
Qty: 0 | Refills: 0 | Status: COMPLETED | OUTPATIENT
Start: 2021-04-12

## 2021-04-12 RX ADMIN — BRODALUMAB 0 MG/1.5ML: 210 INJECTION SUBCUTANEOUS at 00:00

## 2021-04-26 ENCOUNTER — APPOINTMENT (OUTPATIENT)
Dept: DERMATOLOGY | Facility: CLINIC | Age: 76
End: 2021-04-26
Payer: MEDICARE

## 2021-04-26 DIAGNOSIS — Z00.00 ENCOUNTER FOR GENERAL ADULT MEDICAL EXAMINATION W/OUT ABNORMAL FINDINGS: ICD-10-CM

## 2021-04-26 PROCEDURE — 99072 ADDL SUPL MATRL&STAF TM PHE: CPT

## 2021-04-26 PROCEDURE — 99213 OFFICE O/P EST LOW 20 MIN: CPT

## 2021-04-26 RX ORDER — BRODALUMAB 210 MG/1
210 INJECTION SUBCUTANEOUS
Qty: 0 | Refills: 0 | Status: COMPLETED | OUTPATIENT
Start: 2021-04-26

## 2021-04-26 RX ADMIN — BRODALUMAB 0 MG/1.5ML: 210 INJECTION SUBCUTANEOUS at 00:00

## 2021-04-26 NOTE — ASSESSMENT
[FreeTextEntry1] : Psoriasis\par Incomplete response on the siliq.\par Continue q 2 week injections.\par Emollients.\par Hats and sunscreens.

## 2021-04-26 NOTE — PHYSICAL EXAM
[FreeTextEntry3] : Deeply red plaques of the lower legs. O-T Advancement Flap Text: The defect edges were debeveled with a #15 scalpel blade.  Given the location of the defect, shape of the defect and the proximity to free margins an O-T advancement flap was deemed most appropriate.  Using a sterile surgical marker, an appropriate advancement flap was drawn incorporating the defect and placing the expected incisions within the relaxed skin tension lines where possible.    The area thus outlined was incised deep to adipose tissue with a #15 scalpel blade.  The skin margins were undermined to an appropriate distance in all directions utilizing iris scissors.

## 2021-05-12 ENCOUNTER — APPOINTMENT (OUTPATIENT)
Dept: DERMATOLOGY | Facility: CLINIC | Age: 76
End: 2021-05-12

## 2021-05-20 ENCOUNTER — APPOINTMENT (OUTPATIENT)
Dept: DERMATOLOGY | Facility: CLINIC | Age: 76
End: 2021-05-20
Payer: MEDICARE

## 2021-05-20 PROCEDURE — 99213 OFFICE O/P EST LOW 20 MIN: CPT

## 2021-05-20 RX ORDER — BRODALUMAB 210 MG/1
210 INJECTION SUBCUTANEOUS
Qty: 0 | Refills: 0 | Status: COMPLETED | OUTPATIENT
Start: 2021-05-20

## 2021-05-20 RX ADMIN — BRODALUMAB 0 MG/1.5ML: 210 INJECTION SUBCUTANEOUS at 00:00

## 2021-05-20 NOTE — ASSESSMENT
[FreeTextEntry1] : Psoriasis\par Education.\par Emollients.\par Limited sun exposure.\par Siliq - continue q 2 weeks.

## 2021-06-03 ENCOUNTER — APPOINTMENT (OUTPATIENT)
Dept: DERMATOLOGY | Facility: CLINIC | Age: 76
End: 2021-06-03
Payer: MEDICARE

## 2021-06-03 PROCEDURE — 99213 OFFICE O/P EST LOW 20 MIN: CPT

## 2021-06-03 RX ORDER — BRODALUMAB 210 MG/1
210 INJECTION SUBCUTANEOUS
Qty: 0 | Refills: 0 | Status: COMPLETED | OUTPATIENT
Start: 2021-06-03

## 2021-06-03 RX ADMIN — BRODALUMAB 0 MG/1.5ML: 210 INJECTION SUBCUTANEOUS at 00:00

## 2021-06-09 ENCOUNTER — APPOINTMENT (OUTPATIENT)
Dept: DERMATOLOGY | Facility: CLINIC | Age: 76
End: 2021-06-09
Payer: MEDICARE

## 2021-06-09 PROCEDURE — 99213 OFFICE O/P EST LOW 20 MIN: CPT

## 2021-06-17 ENCOUNTER — APPOINTMENT (OUTPATIENT)
Dept: DERMATOLOGY | Facility: CLINIC | Age: 76
End: 2021-06-17
Payer: MEDICARE

## 2021-06-17 ENCOUNTER — APPOINTMENT (OUTPATIENT)
Dept: DERMATOLOGY | Facility: CLINIC | Age: 76
End: 2021-06-17

## 2021-06-17 VITALS — BODY MASS INDEX: 29.66 KG/M2 | WEIGHT: 219 LBS | HEIGHT: 72 IN

## 2021-06-17 PROCEDURE — 99213 OFFICE O/P EST LOW 20 MIN: CPT

## 2021-06-17 RX ORDER — BRODALUMAB 210 MG/1
210 INJECTION SUBCUTANEOUS
Qty: 0 | Refills: 0 | Status: COMPLETED | OUTPATIENT
Start: 2021-06-17

## 2021-06-17 RX ADMIN — BRODALUMAB 0 MG/1.5ML: 210 INJECTION SUBCUTANEOUS at 00:00

## 2021-06-17 NOTE — HISTORY OF PRESENT ILLNESS
[FreeTextEntry1] : Psoriasis, on siliq. [de-identified] : DVW, without complaints.  mild pruritus.

## 2021-07-01 ENCOUNTER — APPOINTMENT (OUTPATIENT)
Dept: DERMATOLOGY | Facility: CLINIC | Age: 76
End: 2021-07-01
Payer: MEDICARE

## 2021-07-01 PROCEDURE — 99213 OFFICE O/P EST LOW 20 MIN: CPT

## 2021-07-01 RX ORDER — BRODALUMAB 210 MG/1
210 INJECTION SUBCUTANEOUS
Qty: 0 | Refills: 0 | Status: COMPLETED | OUTPATIENT
Start: 2021-07-01

## 2021-07-01 RX ADMIN — BRODALUMAB 0 MG/1.5ML: 210 INJECTION SUBCUTANEOUS at 00:00

## 2021-07-19 ENCOUNTER — APPOINTMENT (OUTPATIENT)
Dept: DERMATOLOGY | Facility: CLINIC | Age: 76
End: 2021-07-19
Payer: MEDICARE

## 2021-07-19 DIAGNOSIS — L72.3 SEBACEOUS CYST: ICD-10-CM

## 2021-07-19 PROCEDURE — 99213 OFFICE O/P EST LOW 20 MIN: CPT | Mod: 25

## 2021-07-19 PROCEDURE — 10060 I&D ABSCESS SIMPLE/SINGLE: CPT

## 2021-07-19 RX ADMIN — BRODALUMAB 0 MG/1.5ML: 210 INJECTION SUBCUTANEOUS at 00:00

## 2021-07-19 NOTE — PHYSICAL EXAM
[Alert] : alert [Oriented x 3] : ~L oriented x 3 [Well Nourished] : well nourished [FreeTextEntry3] : Cystic papule with comedonal center, right clavicle.

## 2021-07-19 NOTE — HISTORY OF PRESENT ILLNESS
[FreeTextEntry1] : Patient for Siliq.  Also with irritated lesion of the right clavicle, slowly growing.

## 2021-07-28 ENCOUNTER — APPOINTMENT (OUTPATIENT)
Dept: DERMATOLOGY | Facility: CLINIC | Age: 76
End: 2021-07-28
Payer: MEDICARE

## 2021-07-28 PROCEDURE — 99213 OFFICE O/P EST LOW 20 MIN: CPT | Mod: 24

## 2021-07-28 RX ORDER — BRODALUMAB 210 MG/1
210 INJECTION SUBCUTANEOUS
Qty: 0 | Refills: 0 | Status: COMPLETED | OUTPATIENT
Start: 2021-07-28

## 2021-07-28 RX ADMIN — BRODALUMAB 0 MG/1.5ML: 210 INJECTION SUBCUTANEOUS at 00:00

## 2021-07-28 NOTE — PHYSICAL EXAM
[Alert] : alert [Oriented x 3] : ~L oriented x 3 [Well Nourished] : well nourished [FreeTextEntry3] : Hyperpigmented mild cobblestone patches, bilateral lateral shins.

## 2021-08-03 ENCOUNTER — OUTPATIENT (OUTPATIENT)
Dept: OUTPATIENT SERVICES | Facility: HOSPITAL | Age: 76
LOS: 1 days | End: 2021-08-03
Payer: MEDICARE

## 2021-08-03 VITALS
DIASTOLIC BLOOD PRESSURE: 78 MMHG | RESPIRATION RATE: 16 BRPM | SYSTOLIC BLOOD PRESSURE: 105 MMHG | HEIGHT: 73 IN | TEMPERATURE: 98 F | WEIGHT: 223.11 LBS | HEART RATE: 83 BPM | OXYGEN SATURATION: 99 %

## 2021-08-03 DIAGNOSIS — Z96.641 PRESENCE OF RIGHT ARTIFICIAL HIP JOINT: Chronic | ICD-10-CM

## 2021-08-03 DIAGNOSIS — Z01.818 ENCOUNTER FOR OTHER PREPROCEDURAL EXAMINATION: ICD-10-CM

## 2021-08-03 DIAGNOSIS — N40.0 BENIGN PROSTATIC HYPERPLASIA WITHOUT LOWER URINARY TRACT SYMPTOMS: ICD-10-CM

## 2021-08-03 DIAGNOSIS — Z98.890 OTHER SPECIFIED POSTPROCEDURAL STATES: Chronic | ICD-10-CM

## 2021-08-03 DIAGNOSIS — I25.10 ATHEROSCLEROTIC HEART DISEASE OF NATIVE CORONARY ARTERY WITHOUT ANGINA PECTORIS: Chronic | ICD-10-CM

## 2021-08-03 DIAGNOSIS — Z29.9 ENCOUNTER FOR PROPHYLACTIC MEASURES, UNSPECIFIED: ICD-10-CM

## 2021-08-03 DIAGNOSIS — Z12.11 ENCOUNTER FOR SCREENING FOR MALIGNANT NEOPLASM OF COLON: Chronic | ICD-10-CM

## 2021-08-03 DIAGNOSIS — Z98.89 OTHER SPECIFIED POSTPROCEDURAL STATES: Chronic | ICD-10-CM

## 2021-08-03 DIAGNOSIS — Z96.652 PRESENCE OF LEFT ARTIFICIAL KNEE JOINT: Chronic | ICD-10-CM

## 2021-08-03 LAB
A1C WITH ESTIMATED AVERAGE GLUCOSE RESULT: 6.8 % — HIGH (ref 4–5.6)
ANION GAP SERPL CALC-SCNC: 5 MMOL/L — SIGNIFICANT CHANGE UP (ref 5–17)
APPEARANCE UR: CLEAR — SIGNIFICANT CHANGE UP
BASOPHILS # BLD AUTO: 0.03 K/UL — SIGNIFICANT CHANGE UP (ref 0–0.2)
BASOPHILS NFR BLD AUTO: 0.5 % — SIGNIFICANT CHANGE UP (ref 0–2)
BILIRUB UR-MCNC: NEGATIVE — SIGNIFICANT CHANGE UP
BUN SERPL-MCNC: 15 MG/DL — SIGNIFICANT CHANGE UP (ref 7–23)
CALCIUM SERPL-MCNC: 8.8 MG/DL — SIGNIFICANT CHANGE UP (ref 8.5–10.1)
CHLORIDE SERPL-SCNC: 106 MMOL/L — SIGNIFICANT CHANGE UP (ref 96–108)
CO2 SERPL-SCNC: 27 MMOL/L — SIGNIFICANT CHANGE UP (ref 22–31)
COLOR SPEC: YELLOW — SIGNIFICANT CHANGE UP
CREAT SERPL-MCNC: 0.76 MG/DL — SIGNIFICANT CHANGE UP (ref 0.5–1.3)
DIFF PNL FLD: NEGATIVE — SIGNIFICANT CHANGE UP
EOSINOPHIL # BLD AUTO: 0.15 K/UL — SIGNIFICANT CHANGE UP (ref 0–0.5)
EOSINOPHIL NFR BLD AUTO: 2.5 % — SIGNIFICANT CHANGE UP (ref 0–6)
ESTIMATED AVERAGE GLUCOSE: 148 MG/DL — HIGH (ref 68–114)
GLUCOSE SERPL-MCNC: 130 MG/DL — HIGH (ref 70–99)
GLUCOSE UR QL: NEGATIVE MG/DL — SIGNIFICANT CHANGE UP
HCT VFR BLD CALC: 38.3 % — LOW (ref 39–50)
HGB BLD-MCNC: 13 G/DL — SIGNIFICANT CHANGE UP (ref 13–17)
IMM GRANULOCYTES NFR BLD AUTO: 0.2 % — SIGNIFICANT CHANGE UP (ref 0–1.5)
KETONES UR-MCNC: NEGATIVE — SIGNIFICANT CHANGE UP
LEUKOCYTE ESTERASE UR-ACNC: NEGATIVE — SIGNIFICANT CHANGE UP
LYMPHOCYTES # BLD AUTO: 1.03 K/UL — SIGNIFICANT CHANGE UP (ref 1–3.3)
LYMPHOCYTES # BLD AUTO: 17.4 % — SIGNIFICANT CHANGE UP (ref 13–44)
MCHC RBC-ENTMCNC: 30.8 PG — SIGNIFICANT CHANGE UP (ref 27–34)
MCHC RBC-ENTMCNC: 33.9 GM/DL — SIGNIFICANT CHANGE UP (ref 32–36)
MCV RBC AUTO: 90.8 FL — SIGNIFICANT CHANGE UP (ref 80–100)
MONOCYTES # BLD AUTO: 0.71 K/UL — SIGNIFICANT CHANGE UP (ref 0–0.9)
MONOCYTES NFR BLD AUTO: 12 % — SIGNIFICANT CHANGE UP (ref 2–14)
NEUTROPHILS # BLD AUTO: 3.98 K/UL — SIGNIFICANT CHANGE UP (ref 1.8–7.4)
NEUTROPHILS NFR BLD AUTO: 67.4 % — SIGNIFICANT CHANGE UP (ref 43–77)
NITRITE UR-MCNC: NEGATIVE — SIGNIFICANT CHANGE UP
PH UR: 6 — SIGNIFICANT CHANGE UP (ref 5–8)
PLATELET # BLD AUTO: 194 K/UL — SIGNIFICANT CHANGE UP (ref 150–400)
POTASSIUM SERPL-MCNC: 4.3 MMOL/L — SIGNIFICANT CHANGE UP (ref 3.5–5.3)
POTASSIUM SERPL-SCNC: 4.3 MMOL/L — SIGNIFICANT CHANGE UP (ref 3.5–5.3)
PROT UR-MCNC: 15 MG/DL
RBC # BLD: 4.22 M/UL — SIGNIFICANT CHANGE UP (ref 4.2–5.8)
RBC # FLD: 12.7 % — SIGNIFICANT CHANGE UP (ref 10.3–14.5)
SODIUM SERPL-SCNC: 138 MMOL/L — SIGNIFICANT CHANGE UP (ref 135–145)
SP GR SPEC: 1.01 — SIGNIFICANT CHANGE UP (ref 1.01–1.02)
UROBILINOGEN FLD QL: NEGATIVE MG/DL — SIGNIFICANT CHANGE UP
WBC # BLD: 5.91 K/UL — SIGNIFICANT CHANGE UP (ref 3.8–10.5)
WBC # FLD AUTO: 5.91 K/UL — SIGNIFICANT CHANGE UP (ref 3.8–10.5)

## 2021-08-03 PROCEDURE — 81001 URINALYSIS AUTO W/SCOPE: CPT

## 2021-08-03 PROCEDURE — 71046 X-RAY EXAM CHEST 2 VIEWS: CPT

## 2021-08-03 PROCEDURE — 86900 BLOOD TYPING SEROLOGIC ABO: CPT

## 2021-08-03 PROCEDURE — 80048 BASIC METABOLIC PNL TOTAL CA: CPT

## 2021-08-03 PROCEDURE — 83036 HEMOGLOBIN GLYCOSYLATED A1C: CPT

## 2021-08-03 PROCEDURE — 71046 X-RAY EXAM CHEST 2 VIEWS: CPT | Mod: 26

## 2021-08-03 PROCEDURE — 87086 URINE CULTURE/COLONY COUNT: CPT

## 2021-08-03 PROCEDURE — 36415 COLL VENOUS BLD VENIPUNCTURE: CPT

## 2021-08-03 PROCEDURE — 86850 RBC ANTIBODY SCREEN: CPT

## 2021-08-03 PROCEDURE — 85025 COMPLETE CBC W/AUTO DIFF WBC: CPT

## 2021-08-03 PROCEDURE — 86901 BLOOD TYPING SEROLOGIC RH(D): CPT

## 2021-08-03 PROCEDURE — G0463: CPT | Mod: 25

## 2021-08-03 RX ORDER — METFORMIN HYDROCHLORIDE 850 MG/1
0 TABLET ORAL
Qty: 0 | Refills: 0 | DISCHARGE

## 2021-08-03 NOTE — H&P PST ADULT - HISTORY OF PRESENT ILLNESS
76 y.o WD, WN pleasant male presents for PST with hx of BPH. Patient states he gets up 5 to 15 times a night to urinate.  He has followed with urology and despite medication his symptoms have not been relieved. Patient opting for surgical intervention. Scheduled for Cystoscopy Transurethral Resection of Prostate

## 2021-08-03 NOTE — H&P PST ADULT - NSICDXFAMILYHX_GEN_ALL_CORE_FT
FAMILY HISTORY:  Father  Still living? No  Family history of cancer of GI tract, Age at diagnosis: Age Unknown    Mother  Still living? No  Family history of arthritis, Age at diagnosis: Age Unknown  Family history of asthma, Age at diagnosis: Age Unknown  Family history of heart failure, Age at diagnosis: Age Unknown

## 2021-08-03 NOTE — H&P PST ADULT - ASSESSMENT
76 y.o male scheduled for Cystoscopy Transurethral Resection of Prostate   76 y.o male scheduled for Cystoscopy Transurethral Resection of Prostate  Plan  1. Stop all NSAIDS, herbal supplements and vitamins for 7 days.  2. NPO at midnight.  3. Take the following medications ( ) with small sips of water on the morning of your procedure/surgery.  4. Labs, EKG, CXR  as per surgeon  5. PMD  Dr Smart, cardiology ,Dr Craig, pulmonary Dr Azul visit for optimization prior to surgery as per surgeon  6.COVID swab appt: 8/9/2021           76 y.o male scheduled for Cystoscopy Transurethral Resection of Prostate  Plan  1. Stop all NSAIDS, herbal supplements and vitamins for 7 days.  2. NPO at midnight.  3. Take the following medications ( ) with small sips of water on the morning of your procedure/surgery.  4. Labs, EKG, CXR  as per surgeon  5. PMD  Dr Smart, cardiology ,Dr Craig, pulmonary Dr Azul visit for optimization prior to surgery as per surgeon  6.COVID swab appt: 2021    CAPRINI SCORE    AGE RELATED RISK FACTORS                                                       MOBILITY RELATED FACTORS  [ ] Age 41-60 years                                            (1 Point)                  [ ] Bed rest                                                        (1 Point)  [ ] Age: 61-74 years                                           (2 Points)                [ ] Plaster cast                                                   (2 Points)  [x ] Age= 75 years                                              (3 Points)                 [ ] Bed bound for more than 72 hours                   (2 Points)    DISEASE RELATED RISK FACTORS                                               GENDER SPECIFIC FACTORS  [ ] Edema in the lower extremities                       (1 Point)                  [ ] Pregnancy                                                     (1 Point)  [ ] Varicose veins                                               (1 Point)                  [ ] Post-partum < 6 weeks                                   (1 Point)             [x ] BMI > 25 Kg/m2                                            (1 Point)                  [ ] Hormonal therapy  or oral contraception            (1 Point)                 [ ] Sepsis (in the previous month)                        (1 Point)                  [ ] History of pregnancy complications  [ ] Pneumonia or serious lung disease                                               [ ] Unexplained or recurrent                       (1 Point)           (in the previous month)                               (1 Point)  [ ] Abnormal pulmonary function test                     (1 Point)                 SURGERY RELATED RISK FACTORS  [ ] Acute myocardial infarction                              (1 Point)                 [ ]  Section                                            (1 Point)  [ ] Congestive heart failure (in the previous month)  (1 Point)                 [ ] Minor surgery                                                 (1 Point)   [ ] Inflammatory bowel disease                             (1 Point)                 [ ] Arthroscopic surgery                                        (2 Points)  [ ] Central venous access                                    (2 Points)                [ ] General surgery lasting more than 45 minutes   (2 Points)       [ ] Stroke (in the previous month)                          (5 Points)               [ ] Elective arthroplasty                                        (5 Points)                                                                                                                                               HEMATOLOGY RELATED FACTORS                                                 TRAUMA RELATED RISK FACTORS  [ ] Prior episodes of VTE                                     (3 Points)                 [ ] Fracture of the hip, pelvis, or leg                       (5 Points)  [ ] Positive family history for VTE                         (3 Points)                 [ ] Acute spinal cord injury (in the previous month)  (5 Points)  [ ] Prothrombin 44053 A                                      (3 Points)                 [ ] Paralysis  (less than 1 month)                          (5 Points)  [ ] Factor V Leiden                                             (3 Points)                 [ ] Multiple Trauma within 1 month                         (5 Points)  [ ] Lupus anticoagulants                                     (3 Points)                                                           [ ] Anticardiolipin antibodies                                (3 Points)                                                       [ ] High homocysteine in the blood                      (3 Points)                                             [ ] Other congenital or acquired thrombophilia       (3 Points)                                                [ ] Heparin induced thrombocytopenia                  (3 Points)                                          Total Score [      6    ]    The Caprini score indicates that this patient is at high risk for a VTE event (score > = 6).    Ssurgical patients in this group will benefit from both pharmacologic prophylaxis and intermittent compression devices.    The surgical team will determine the balance between VTE risk and bleeding risk, and other clinical considerations.

## 2021-08-03 NOTE — H&P PST ADULT - NSICDXPASTMEDICALHX_GEN_ALL_CORE_FT
PAST MEDICAL HISTORY:  BPH (benign prostatic hypertrophy)     Cataract of both eyes, unspecified cataract type     Diabetes     History of colon polyps     Hyperlipidemia     Pain of both hip joints     Primary osteoarthritis of left knee history of. knee replacement    Primary osteoarthritis of right hip     Psoriasis     Sleep apnea uses CPAP    Spinal stenosis of lumbar region, unspecified whether neurogenic claudication present     Tinnitus of both ears     Urinary frequency

## 2021-08-03 NOTE — H&P PST ADULT - NSICDXPASTSURGICALHX_GEN_ALL_CORE_FT
PAST SURGICAL HISTORY:  CAD (coronary artery disease) cardiac cath 7/9/15 negative    Encounter for screening colonoscopy 2019    H/O thumb surgery left --2019    H/O total knee replacement, left 2016    History of right hip replacement 2018    S/P left knee arthroscopy

## 2021-08-04 DIAGNOSIS — Z01.818 ENCOUNTER FOR OTHER PREPROCEDURAL EXAMINATION: ICD-10-CM

## 2021-08-04 DIAGNOSIS — N40.0 BENIGN PROSTATIC HYPERPLASIA WITHOUT LOWER URINARY TRACT SYMPTOMS: ICD-10-CM

## 2021-08-04 LAB
CULTURE RESULTS: SIGNIFICANT CHANGE UP
SPECIMEN SOURCE: SIGNIFICANT CHANGE UP

## 2021-08-09 ENCOUNTER — APPOINTMENT (OUTPATIENT)
Dept: DISASTER EMERGENCY | Facility: CLINIC | Age: 76
End: 2021-08-09

## 2021-08-09 DIAGNOSIS — Z01.818 ENCOUNTER FOR OTHER PREPROCEDURAL EXAMINATION: ICD-10-CM

## 2021-08-09 LAB — SARS-COV-2 N GENE NPH QL NAA+PROBE: NOT DETECTED

## 2021-08-10 ENCOUNTER — APPOINTMENT (OUTPATIENT)
Dept: DERMATOLOGY | Facility: CLINIC | Age: 76
End: 2021-08-10

## 2021-08-12 ENCOUNTER — OUTPATIENT (OUTPATIENT)
Dept: INPATIENT UNIT | Facility: HOSPITAL | Age: 76
LOS: 1 days | Discharge: HOME | End: 2021-08-12
Payer: MEDICARE

## 2021-08-12 ENCOUNTER — RESULT REVIEW (OUTPATIENT)
Age: 76
End: 2021-08-12

## 2021-08-12 VITALS
HEART RATE: 81 BPM | TEMPERATURE: 97 F | DIASTOLIC BLOOD PRESSURE: 66 MMHG | SYSTOLIC BLOOD PRESSURE: 111 MMHG | RESPIRATION RATE: 16 BRPM | OXYGEN SATURATION: 95 %

## 2021-08-12 VITALS
HEIGHT: 73 IN | DIASTOLIC BLOOD PRESSURE: 87 MMHG | RESPIRATION RATE: 16 BRPM | TEMPERATURE: 97 F | SYSTOLIC BLOOD PRESSURE: 130 MMHG | OXYGEN SATURATION: 97 % | WEIGHT: 223.11 LBS | HEART RATE: 92 BPM

## 2021-08-12 DIAGNOSIS — Z98.89 OTHER SPECIFIED POSTPROCEDURAL STATES: Chronic | ICD-10-CM

## 2021-08-12 DIAGNOSIS — Z12.11 ENCOUNTER FOR SCREENING FOR MALIGNANT NEOPLASM OF COLON: Chronic | ICD-10-CM

## 2021-08-12 DIAGNOSIS — Z98.890 OTHER SPECIFIED POSTPROCEDURAL STATES: Chronic | ICD-10-CM

## 2021-08-12 DIAGNOSIS — Z96.652 PRESENCE OF LEFT ARTIFICIAL KNEE JOINT: Chronic | ICD-10-CM

## 2021-08-12 DIAGNOSIS — N40.0 BENIGN PROSTATIC HYPERPLASIA WITHOUT LOWER URINARY TRACT SYMPTOMS: ICD-10-CM

## 2021-08-12 DIAGNOSIS — I25.10 ATHEROSCLEROTIC HEART DISEASE OF NATIVE CORONARY ARTERY WITHOUT ANGINA PECTORIS: Chronic | ICD-10-CM

## 2021-08-12 DIAGNOSIS — Z96.641 PRESENCE OF RIGHT ARTIFICIAL HIP JOINT: Chronic | ICD-10-CM

## 2021-08-12 PROCEDURE — 88305 TISSUE EXAM BY PATHOLOGIST: CPT | Mod: 26

## 2021-08-12 PROCEDURE — 82962 GLUCOSE BLOOD TEST: CPT

## 2021-08-12 PROCEDURE — 88305 TISSUE EXAM BY PATHOLOGIST: CPT

## 2021-08-12 RX ORDER — SODIUM CHLORIDE 9 MG/ML
1000 INJECTION, SOLUTION INTRAVENOUS
Refills: 0 | Status: DISCONTINUED | OUTPATIENT
Start: 2021-08-12 | End: 2021-08-12

## 2021-08-12 RX ORDER — FENTANYL CITRATE 50 UG/ML
25 INJECTION INTRAVENOUS
Refills: 0 | Status: DISCONTINUED | OUTPATIENT
Start: 2021-08-12 | End: 2021-08-12

## 2021-08-12 RX ORDER — HYDROMORPHONE HYDROCHLORIDE 2 MG/ML
0.5 INJECTION INTRAMUSCULAR; INTRAVENOUS; SUBCUTANEOUS
Refills: 0 | Status: DISCONTINUED | OUTPATIENT
Start: 2021-08-12 | End: 2021-08-12

## 2021-08-12 RX ORDER — OXYCODONE HYDROCHLORIDE 5 MG/1
10 TABLET ORAL ONCE
Refills: 0 | Status: DISCONTINUED | OUTPATIENT
Start: 2021-08-12 | End: 2021-08-12

## 2021-08-12 RX ORDER — FINASTERIDE 5 MG/1
1 TABLET, FILM COATED ORAL
Qty: 0 | Refills: 0 | DISCHARGE

## 2021-08-12 RX ORDER — ATORVASTATIN CALCIUM 80 MG/1
1 TABLET, FILM COATED ORAL
Qty: 0 | Refills: 0 | DISCHARGE

## 2021-08-12 NOTE — ASU PATIENT PROFILE, ADULT - PAIN, FACTORS THAT RELIEVE, PROFILE
Called pt's mother to inform her Dr. Obrien signed pt's script.  
Pt's mother called upset that pt's prescription had not been signed yet. I advised her that when she sends a message for a refill is can take up to 48 hours for the MD to sign the script. I advised her I will call Dr Obrien and have him sign the script as soon as he is done with a procedure.  
rest

## 2021-08-19 DIAGNOSIS — N40.1 BENIGN PROSTATIC HYPERPLASIA WITH LOWER URINARY TRACT SYMPTOMS: ICD-10-CM

## 2021-08-19 DIAGNOSIS — I65.29 OCCLUSION AND STENOSIS OF UNSPECIFIED CAROTID ARTERY: ICD-10-CM

## 2021-08-19 DIAGNOSIS — Z79.82 LONG TERM (CURRENT) USE OF ASPIRIN: ICD-10-CM

## 2021-08-19 DIAGNOSIS — Z99.89 DEPENDENCE ON OTHER ENABLING MACHINES AND DEVICES: ICD-10-CM

## 2021-08-19 DIAGNOSIS — N13.8 OTHER OBSTRUCTIVE AND REFLUX UROPATHY: ICD-10-CM

## 2021-08-19 DIAGNOSIS — E78.5 HYPERLIPIDEMIA, UNSPECIFIED: ICD-10-CM

## 2021-08-19 DIAGNOSIS — Z96.641 PRESENCE OF RIGHT ARTIFICIAL HIP JOINT: ICD-10-CM

## 2021-08-19 DIAGNOSIS — Z96.652 PRESENCE OF LEFT ARTIFICIAL KNEE JOINT: ICD-10-CM

## 2021-08-19 DIAGNOSIS — L40.9 PSORIASIS, UNSPECIFIED: ICD-10-CM

## 2021-08-19 DIAGNOSIS — I25.10 ATHEROSCLEROTIC HEART DISEASE OF NATIVE CORONARY ARTERY WITHOUT ANGINA PECTORIS: ICD-10-CM

## 2021-08-19 DIAGNOSIS — E11.9 TYPE 2 DIABETES MELLITUS WITHOUT COMPLICATIONS: ICD-10-CM

## 2021-08-19 DIAGNOSIS — Z87.891 PERSONAL HISTORY OF NICOTINE DEPENDENCE: ICD-10-CM

## 2021-08-19 DIAGNOSIS — Z79.84 LONG TERM (CURRENT) USE OF ORAL HYPOGLYCEMIC DRUGS: ICD-10-CM

## 2021-08-19 DIAGNOSIS — M19.90 UNSPECIFIED OSTEOARTHRITIS, UNSPECIFIED SITE: ICD-10-CM

## 2021-08-19 DIAGNOSIS — G47.33 OBSTRUCTIVE SLEEP APNEA (ADULT) (PEDIATRIC): ICD-10-CM

## 2021-09-07 ENCOUNTER — TRANSCRIPTION ENCOUNTER (OUTPATIENT)
Age: 76
End: 2021-09-07

## 2021-10-15 ENCOUNTER — TRANSCRIPTION ENCOUNTER (OUTPATIENT)
Age: 76
End: 2021-10-15

## 2021-10-27 ENCOUNTER — NON-APPOINTMENT (OUTPATIENT)
Age: 76
End: 2021-10-27

## 2021-11-23 ENCOUNTER — APPOINTMENT (OUTPATIENT)
Dept: DERMATOLOGY | Facility: CLINIC | Age: 76
End: 2021-11-23
Payer: MEDICARE

## 2021-11-23 VITALS — WEIGHT: 220 LBS | BODY MASS INDEX: 29.84 KG/M2

## 2021-11-23 PROCEDURE — 99214 OFFICE O/P EST MOD 30 MIN: CPT

## 2021-11-23 RX ORDER — USTEKINUMAB 90 MG/ML
90 INJECTION, SOLUTION SUBCUTANEOUS
Qty: 0 | Refills: 0 | Status: COMPLETED | OUTPATIENT
Start: 2021-11-23

## 2021-11-23 RX ADMIN — USTEKINUMAB 0 MG/ML: 90 INJECTION, SOLUTION SUBCUTANEOUS at 00:00

## 2021-11-23 NOTE — HISTORY OF PRESENT ILLNESS
[FreeTextEntry1] : Psoriasis, to begin Stelara today. [de-identified] : Insurance issues, kept him off biologics for months.\par Psoriasis flaring.

## 2021-11-23 NOTE — ASSESSMENT
[FreeTextEntry1] : Psoriasis\par Stelara, dose #1 of loading doses.\par Check labs today.\par f/u in 1 week.

## 2021-11-29 LAB
ALBUMIN SERPL ELPH-MCNC: 4.4 G/DL
ALP BLD-CCNC: 51 U/L
ALT SERPL-CCNC: 15 U/L
AST SERPL-CCNC: 12 U/L
BILIRUB DIRECT SERPL-MCNC: 0.1 MG/DL
BILIRUB INDIRECT SERPL-MCNC: 0.2 MG/DL
BILIRUB SERPL-MCNC: 0.2 MG/DL
HBV CORE IGG+IGM SER QL: NONREACTIVE
HBV SURFACE AB SER QL: NONREACTIVE
HCV RNA SERPL NAA DL=5-ACNC: NOT DETECTED IU/ML
HCV RNA SERPL NAA+PROBE-LOG IU: NOT DETECTED LOG10IU/ML
M TB IFN-G BLD-IMP: NEGATIVE
PROT SERPL-MCNC: 6.7 G/DL
QUANTIFERON TB PLUS MITOGEN MINUS NIL: 7.14 IU/ML
QUANTIFERON TB PLUS NIL: 0.01 IU/ML
QUANTIFERON TB PLUS TB1 MINUS NIL: 0 IU/ML
QUANTIFERON TB PLUS TB2 MINUS NIL: 0 IU/ML

## 2021-11-30 LAB — HEPATITIS D VIRUS IGM AB: NORMAL

## 2021-12-01 ENCOUNTER — APPOINTMENT (OUTPATIENT)
Dept: DERMATOLOGY | Facility: CLINIC | Age: 76
End: 2021-12-01

## 2021-12-09 ENCOUNTER — APPOINTMENT (OUTPATIENT)
Dept: DERMATOLOGY | Facility: CLINIC | Age: 76
End: 2021-12-09

## 2021-12-16 ENCOUNTER — APPOINTMENT (OUTPATIENT)
Dept: DERMATOLOGY | Facility: CLINIC | Age: 76
End: 2021-12-16

## 2021-12-21 ENCOUNTER — APPOINTMENT (OUTPATIENT)
Dept: DERMATOLOGY | Facility: CLINIC | Age: 76
End: 2021-12-21
Payer: MEDICARE

## 2021-12-21 VITALS — HEIGHT: 72 IN | WEIGHT: 221 LBS | BODY MASS INDEX: 29.93 KG/M2

## 2021-12-21 PROCEDURE — 99213 OFFICE O/P EST LOW 20 MIN: CPT

## 2021-12-21 RX ORDER — USTEKINUMAB 90 MG/ML
90 INJECTION, SOLUTION SUBCUTANEOUS
Qty: 0 | Refills: 0 | Status: COMPLETED | OUTPATIENT
Start: 2021-12-21

## 2021-12-21 RX ADMIN — USTEKINUMAB 0 MG/ML: 90 INJECTION, SOLUTION SUBCUTANEOUS at 00:00

## 2021-12-21 NOTE — HISTORY OF PRESENT ILLNESS
[FreeTextEntry1] : Psoriasis, on Stelara. [de-identified] : 2nd loading dose today.  Doing well, with marked improvement after the 1st Stelara injection.

## 2021-12-21 NOTE — ASSESSMENT
[FreeTextEntry1] : Psoriasis\par Much improved.\par Stelara today, and f/u in 3 months.\par Continue topicals as needed.\par Emollients encouraged.

## 2021-12-29 ENCOUNTER — APPOINTMENT (OUTPATIENT)
Dept: DERMATOLOGY | Facility: CLINIC | Age: 76
End: 2021-12-29

## 2022-01-06 ENCOUNTER — APPOINTMENT (OUTPATIENT)
Dept: DERMATOLOGY | Facility: CLINIC | Age: 77
End: 2022-01-06

## 2022-02-23 NOTE — ASU PATIENT PROFILE, ADULT - DOMESTIC TRAVEL HIGH RISK QUESTION
[facility] Hospitalist Progress Note      Subjective    Patient and daughter agreeable to proceed with surgery      Objective  Vitals with min/max:    Vital Last Value 24 Hour Range   Temperature 98.4 °F (36.9 °C) (02/23/22 0818) Temp  Min: 97.9 °F (36.6 °C)  Max: 98.4 °F (36.9 °C)   Pulse 64 (02/23/22 0818) Pulse  Min: 60  Max: 76   Respiratory 18 (02/23/22 0500) Resp  Min: 18  Max: 18   Non-Invasive  Blood Pressure 121/69 (02/23/22 0818) BP  Min: 121/69  Max: 181/93   Pulse Oximetry 98 % (02/23/22 0818) SpO2  Min: 95 %  Max: 98 %   Arterial   Blood Pressure   No data recorded      Body mass index is 26.81 kg/m².    I/O's:  No intake or output data in the 24 hours ending 02/23/22 1404    Physical Exam:  General: patient not in acute distress.  HEENT: Normocephalic. Normal conjunctiva.   Respiratory: chest expansion wnl. Lung clear to auscultation bilaterally.  Cardiovascular: Regular rate and rhythm.  No peripheral edema.  Gastrointestinal: Abdomen soft, non tender, non distended. Bowel sound present in all 4 quadrants.  Genitourinary: No suprapubic tenderness. No costovertebra angle tenderness.  Neurology: alert and oriented x1-2  Skin: Warm. No concerning rash.  Psychiatry: Cooperative         Labs   Admission on 02/20/2022   Component Date Value Ref Range Status   • Magnesium 02/21/2022 2.2  1.7 - 2.4 mg/dL Final   • Sodium 02/21/2022 129 (A) 135 - 145 mmol/L Final   • Potassium 02/21/2022 4.1  3.4 - 5.1 mmol/L Final   • Chloride 02/21/2022 98  98 - 107 mmol/L Final   • Carbon Dioxide 02/21/2022 27  21 - 32 mmol/L Final   • Anion Gap 02/21/2022 8 (A) 10 - 20 mmol/L Final   • Glucose 02/21/2022 102 (A) 70 - 99 mg/dL Final   • BUN 02/21/2022 34 (A) 6 - 20 mg/dL Final   • Creatinine 02/21/2022 4.46 (A) 0.51 - 0.95 mg/dL Final   • Glomerular Filtration Rate 02/21/2022 11 (A) >=60 Final    eGFR <15 mL/min/1.73m2 = Kidney failure or Stage 5 CKD (chronic kidney disease). Estimated GFR calculated using the 2009 CKD-EPI  creatinine equation.   • BUN/ Creatinine Ratio 02/21/2022 8  7 - 25 Final   • Calcium 02/21/2022 8.9  8.4 - 10.2 mg/dL Final   • WBC 02/21/2022 7.5  4.2 - 11.0 K/mcL Final   • RBC 02/21/2022 3.93 (A) 4.00 - 5.20 mil/mcL Final   • HGB 02/21/2022 10.5 (A) 12.0 - 15.5 g/dL Final   • HCT 02/21/2022 34.4 (A) 36.0 - 46.5 % Final   • MCV 02/21/2022 87.5  78.0 - 100.0 fl Final   • MCH 02/21/2022 26.7  26.0 - 34.0 pg Final   • MCHC 02/21/2022 30.5 (A) 32.0 - 36.5 g/dL Final   • RDW-CV 02/21/2022 14.1  11.0 - 15.0 % Final   • RDW-SD 02/21/2022 45.7  39.0 - 50.0 fL Final   • PLT 02/21/2022 235  140 - 450 K/mcL Final   • NRBC 02/21/2022 0  <=0 /100 WBC Final   • Neutrophil, Percent 02/21/2022 72  % Final   • Lymphocytes, Percent 02/21/2022 10  % Final   • Mono, Percent 02/21/2022 15  % Final   • Eosinophils, Percent 02/21/2022 2  % Final   • Basophils, Percent 02/21/2022 0  % Final   • Immature Granulocytes 02/21/2022 1  % Final   • Absolute Neutrophils 02/21/2022 5.5  1.8 - 7.7 K/mcL Final   • Absolute Lymphocytes 02/21/2022 0.7 (A) 1.0 - 4.0 K/mcL Final   • Absolute Monocytes 02/21/2022 1.1 (A) 0.3 - 0.9 K/mcL Final   • Absolute Eosinophils  02/21/2022 0.2  0.0 - 0.5 K/mcL Final   • Absolute Basophils 02/21/2022 0.0  0.0 - 0.3 K/mcL Final   • Absolute Immmature Granulocytes 02/21/2022 0.0  0.0 - 0.2 K/mcL Final   • Hepatitis B Surface Antigen 02/20/2022 Negative  Negative Final   • GLUCOSE, BEDSIDE - POINT OF CARE 02/20/2022 144 (A) 70 - 99 mg/dL Final   • GLUCOSE, BEDSIDE - POINT OF CARE 02/21/2022 101 (A) 70 - 99 mg/dL Final   • WBC 02/21/2022 9.1  4.2 - 11.0 K/mcL Final   • RBC 02/21/2022 4.37  4.00 - 5.20 mil/mcL Final   • HGB 02/21/2022 11.6 (A) 12.0 - 15.5 g/dL Final   • HCT 02/21/2022 37.7  36.0 - 46.5 % Final   • MCV 02/21/2022 86.3  78.0 - 100.0 fl Final   • MCH 02/21/2022 26.5  26.0 - 34.0 pg Final   • MCHC 02/21/2022 30.8 (A) 32.0 - 36.5 g/dL Final   • RDW-CV 02/21/2022 14.1  11.0 - 15.0 % Final   • RDW-SD  02/21/2022 44.2  39.0 - 50.0 fL Final   • PLT 02/21/2022 264  140 - 450 K/mcL Final   • NRBC 02/21/2022 0  <=0 /100 WBC Final   • Neutrophil, Percent 02/21/2022 74  % Final   • Lymphocytes, Percent 02/21/2022 10  % Final   • Mono, Percent 02/21/2022 13  % Final   • Eosinophils, Percent 02/21/2022 2  % Final   • Basophils, Percent 02/21/2022 0  % Final   • Immature Granulocytes 02/21/2022 1  % Final   • Absolute Neutrophils 02/21/2022 6.8  1.8 - 7.7 K/mcL Final   • Absolute Lymphocytes 02/21/2022 0.9 (A) 1.0 - 4.0 K/mcL Final   • Absolute Monocytes 02/21/2022 1.2 (A) 0.3 - 0.9 K/mcL Final   • Absolute Eosinophils  02/21/2022 0.1  0.0 - 0.5 K/mcL Final   • Absolute Basophils 02/21/2022 0.0  0.0 - 0.3 K/mcL Final   • Absolute Immmature Granulocytes 02/21/2022 0.1  0.0 - 0.2 K/mcL Final   • GLUCOSE, BEDSIDE - POINT OF CARE 02/21/2022 111 (A) 70 - 99 mg/dL Final   • Sodium 02/22/2022 135  135 - 145 mmol/L Final   • Potassium 02/22/2022 4.3  3.4 - 5.1 mmol/L Final   • Chloride 02/22/2022 102  98 - 107 mmol/L Final   • Carbon Dioxide 02/22/2022 28  21 - 32 mmol/L Final   • Anion Gap 02/22/2022 9 (A) 10 - 20 mmol/L Final   • Glucose 02/22/2022 100 (A) 70 - 99 mg/dL Final   • BUN 02/22/2022 24 (A) 6 - 20 mg/dL Final   • Creatinine 02/22/2022 3.87 (A) 0.51 - 0.95 mg/dL Final   • Glomerular Filtration Rate 02/22/2022 13 (A) >=60 Final    eGFR <15 mL/min/1.73m2 = Kidney failure or Stage 5 CKD (chronic kidney disease). Estimated GFR calculated using the 2009 CKD-EPI creatinine equation.   • BUN/ Creatinine Ratio 02/22/2022 6 (A) 7 - 25 Final   • Calcium 02/22/2022 9.2  8.4 - 10.2 mg/dL Final   • GLUCOSE, BEDSIDE - POINT OF CARE 02/21/2022 93  70 - 99 mg/dL Final   • Cholesterol 02/22/2022 177  <=199 mg/dL Final    Desirable         <200  Borderline High   200 to 239  High              >=240   • Triglycerides 02/22/2022 133  <=149 mg/dL Final    Normal            <150  Borderline High   150 to 199  High              200 to  499  Very High         >=500   • HDL 02/22/2022 77  >=50 mg/dL Final    Low              <40  Borderline Low   40 to 49  Near Optimal     50 to 59  Optimal          >=60   • LDL 02/22/2022 73  <=129 mg/dL Final    OPTIMAL           <100  NEAR OPTIMAL      100 to 129  BORDERLINE HIGH   130 to 159  HIGH              160 to 189  VERY HIGH         >=190   • Non-HDL Cholesterol 02/22/2022 100  mg/dL Final    Therapeutic Target:  CHD and risk equivalents  <130  Multiple risk factors     <160  0 to 1 risk factor        <190   • Cholesterol/ HDL Ratio 02/22/2022 2.3  <=4.4 Final   • Phosphorus 02/23/2022 4.8 (A) 2.4 - 4.7 mg/dL Final   • Sodium 02/23/2022 133 (A) 135 - 145 mmol/L Final   • Potassium 02/23/2022 4.5  3.4 - 5.1 mmol/L Final   • Chloride 02/23/2022 99  98 - 107 mmol/L Final   • Carbon Dioxide 02/23/2022 26  21 - 32 mmol/L Final   • Anion Gap 02/23/2022 13  10 - 20 mmol/L Final   • Glucose 02/23/2022 92  70 - 99 mg/dL Final   • BUN 02/23/2022 36 (A) 6 - 20 mg/dL Final   • Creatinine 02/23/2022 4.67 (A) 0.51 - 0.95 mg/dL Final   • Glomerular Filtration Rate 02/23/2022 10 (A) >=60 Final    eGFR <15 mL/min/1.73m2 = Kidney failure or Stage 5 CKD (chronic kidney disease). Estimated GFR calculated using the 2009 CKD-EPI creatinine equation.   • BUN/ Creatinine Ratio 02/23/2022 8  7 - 25 Final   • Calcium 02/23/2022 9.7  8.4 - 10.2 mg/dL Final   • HGB 02/23/2022 10.8 (A) 12.0 - 15.5 g/dL Final   • HCT 02/23/2022 35.6 (A) 36.0 - 46.5 % Final   • GLUCOSE, BEDSIDE - POINT OF CARE 02/22/2022 95  70 - 99 mg/dL Final   • GLUCOSE, BEDSIDE - POINT OF CARE 02/23/2022 94  70 - 99 mg/dL Final   • GLUCOSE, BEDSIDE - POINT OF CARE 02/23/2022 152 (A) 70 - 99 mg/dL Final            Assessment and Plan  Principal Problem:    Lumbar stenosis with neurogenic claudication  Active Problems:    Pure hypercholesterolemia    ESRD on hemodialysis (CMS/HCC)    Secondary hyperparathyroidism of renal origin (CMS/HCC)    Radiculopathy affecting  upper extremity     #Metabolic encephalopathy  #small stroke [place]  CT head negative, MRI showing acute to subacute infarct   Patient receiving dialysis at present  Hold off on narcotic pain medications  ASA allergy, will start plavix after surgery  ECHO pending     #Severe lumbar stenosis and neurogenic claudication   MRI showed severe spinal stenosis  On gabapentin, cymbalta, Norco and morphine for breakthrough pain   Neurosurgery advising lumbar decompression  Going for surgery tomorrow     #HTN  Resume home meds       #ESRD on HD M and F   Renal consult  HD yesterday      #HLP  Continue statin      #Gout  Continue allopurinol     #Risk Stratification  Patient is going for urgent surgery for spinal stenosis which is moderate to high risk surgery.  She is at elevated risk given she meets 2 RCRI criteria.  Will obtain cardiology consult for further stratification though given no history of active cardiac conditions may be able to proceed.     DVT prophylaxis:. SCD,   Current Active Medications for DVT Prophylaxis (From admission, onward)         Stop     heparin (porcine) injection 5,000 Units  5,000 Units,   Subcutaneous,   3 times per day         --               Code status:  Code Status Information     Code Status    Full Resuscitation        PCP: Judy YANEZ MD    Discussed plan of care with nurse, patient.    Time spent 35  min ,greater than 50% of the time spent reviewing the patient records, coordinating patient care plan and discussing the above care plan  with the patient ,Nurse and the consultant    No

## 2022-03-23 ENCOUNTER — APPOINTMENT (OUTPATIENT)
Dept: DERMATOLOGY | Facility: CLINIC | Age: 77
End: 2022-03-23
Payer: MEDICARE

## 2022-03-23 PROCEDURE — 99213 OFFICE O/P EST LOW 20 MIN: CPT

## 2022-03-23 RX ORDER — USTEKINUMAB 90 MG/ML
90 INJECTION, SOLUTION SUBCUTANEOUS
Qty: 0 | Refills: 0 | Status: COMPLETED | OUTPATIENT
Start: 2022-03-23

## 2022-03-23 RX ORDER — BRODALUMAB 210 MG/1
210 INJECTION SUBCUTANEOUS
Qty: 6 | Refills: 5 | Status: DISCONTINUED | COMMUNITY
End: 2022-03-23

## 2022-03-23 RX ORDER — BRODALUMAB 210 MG/1
210 INJECTION SUBCUTANEOUS
Qty: 0 | Refills: 0 | Status: COMPLETED | OUTPATIENT
Start: 2021-07-19

## 2022-03-23 RX ADMIN — USTEKINUMAB 0 MG/ML: 90 INJECTION, SOLUTION SUBCUTANEOUS at 00:00

## 2022-03-23 NOTE — ASSESSMENT
[FreeTextEntry1] : Psoriasis\par Some flare over the past few weeks.\par Continue calcipotriene cream.\par Use the betamethasone ointment if/when needed.\par Emollients.

## 2022-03-23 NOTE — HISTORY OF PRESENT ILLNESS
[FreeTextEntry1] : Psoriasis on Stelara. [de-identified] : He notes some recent flare.\par He had COVID in approx. January, with minimal illness after having monoclonal antibodies at [facility].

## 2022-06-01 ENCOUNTER — APPOINTMENT (OUTPATIENT)
Dept: DERMATOLOGY | Facility: CLINIC | Age: 77
End: 2022-06-01
Payer: MEDICARE

## 2022-06-01 PROCEDURE — 99214 OFFICE O/P EST MOD 30 MIN: CPT

## 2022-06-01 NOTE — HISTORY OF PRESENT ILLNESS
[FreeTextEntry1] : Psoriasis. [de-identified] : He notes he has had some breakthrough, despite tx with Stelara, now s/p 3 injections.

## 2022-06-01 NOTE — ASSESSMENT
[FreeTextEntry1] : Psoriasis\par Discussed at length, including current flares.\par Continue Stelara for now.\par f/u 6 weeks after the next Stelara injection.  If breakthrough at that time, will consider other options including possibly returning to [place].\par Continue Calcipotriene and betamethasone - self compounded daily.

## 2022-06-01 NOTE — PHYSICAL EXAM
[Alert] : alert [Oriented x 3] : ~L oriented x 3 [Well Nourished] : well nourished [FreeTextEntry3] : Erythematous patches and plaques of the dorsal hands, elbows, and knees.\par Shins doing well.

## 2022-06-21 ENCOUNTER — APPOINTMENT (OUTPATIENT)
Dept: DERMATOLOGY | Facility: CLINIC | Age: 77
End: 2022-06-21
Payer: MEDICARE

## 2022-06-21 DIAGNOSIS — L30.4 ERYTHEMA INTERTRIGO: ICD-10-CM

## 2022-06-21 PROCEDURE — 99213 OFFICE O/P EST LOW 20 MIN: CPT

## 2022-06-21 NOTE — ASSESSMENT
[FreeTextEntry1] : Psoriasis\par f/u with Stelara, once received.\par \par Intertrigo\par Education.\par Keep region dry.\par Hytone 2.5% cream bid prn.

## 2022-06-21 NOTE — HISTORY OF PRESENT ILLNESS
[FreeTextEntry1] : Psoriasis on Stelara. [de-identified] : Notes mild flaring over the past week or two - hands and knees.\par He has not received the Stelara yet for this months injection.\par \par Also with irritation of the groin region.

## 2022-06-21 NOTE — PHYSICAL EXAM
[Alert] : alert [Oriented x 3] : ~L oriented x 3 [Well Nourished] : well nourished [FreeTextEntry3] : erythematous patches, and papules, and plaques - hands, knees shins.\par \par mild erythema of the groin, left side.

## 2022-06-27 ENCOUNTER — APPOINTMENT (OUTPATIENT)
Dept: DERMATOLOGY | Facility: CLINIC | Age: 77
End: 2022-06-27
Payer: MEDICARE

## 2022-06-27 VITALS — BODY MASS INDEX: 29.52 KG/M2 | HEIGHT: 74 IN | WEIGHT: 230 LBS

## 2022-06-27 PROCEDURE — 99213 OFFICE O/P EST LOW 20 MIN: CPT

## 2022-06-27 RX ORDER — USTEKINUMAB 90 MG/ML
90 INJECTION, SOLUTION SUBCUTANEOUS
Qty: 0 | Refills: 0 | Status: COMPLETED | OUTPATIENT
Start: 2022-06-27

## 2022-06-27 RX ADMIN — USTEKINUMAB 0 MG/ML: 90 INJECTION, SOLUTION SUBCUTANEOUS at 00:00

## 2022-06-29 RX ORDER — HYDROCORTISONE 25 MG/G
2.5 CREAM TOPICAL TWICE DAILY
Qty: 1 | Refills: 1 | Status: ACTIVE | COMMUNITY
Start: 2022-06-21 | End: 1900-01-01

## 2022-07-27 ENCOUNTER — APPOINTMENT (OUTPATIENT)
Dept: DERMATOLOGY | Facility: CLINIC | Age: 77
End: 2022-07-27

## 2022-09-15 ENCOUNTER — APPOINTMENT (OUTPATIENT)
Dept: DERMATOLOGY | Facility: CLINIC | Age: 77
End: 2022-09-15

## 2022-09-15 DIAGNOSIS — Z79.899 OTHER LONG TERM (CURRENT) DRUG THERAPY: ICD-10-CM

## 2022-09-15 PROCEDURE — 99214 OFFICE O/P EST MOD 30 MIN: CPT

## 2022-09-17 PROBLEM — Z79.899 HIGH RISK MEDICATION USE: Status: ACTIVE | Noted: 2022-09-17

## 2022-09-22 ENCOUNTER — APPOINTMENT (OUTPATIENT)
Dept: DERMATOLOGY | Facility: CLINIC | Age: 77
End: 2022-09-22

## 2022-10-27 NOTE — ASU PATIENT PROFILE, ADULT - TEACHING/LEARNING OTHER LEARNERS
Patient seen and examined with RN today. Briefly discussed expectations for postoperative course, pain medication protocols and refills, and future postoperative visits. Agree with assessment [place] Pain medication refills to be provided as appropriate. Patient will follow up with their surgeon as scheduled at 6 weeks and has been instructed to contact us sooner with new concerns/questions.   Ifeoma Andrews PA-C  10/27/22       spouse

## 2022-11-07 ENCOUNTER — NON-APPOINTMENT (OUTPATIENT)
Age: 77
End: 2022-11-07

## 2022-11-16 RX ORDER — BETAMETHASONE DIPROPIONATE 0.5 MG/G
0.05 OINTMENT, AUGMENTED TOPICAL TWICE DAILY
Qty: 1 | Refills: 3 | Status: ACTIVE | OUTPATIENT
Start: 2018-11-02

## 2022-11-17 RX ORDER — CALCIPOTRIENE 50 UG/G
0.01 CREAM TOPICAL TWICE DAILY
Qty: 1 | Refills: 5 | Status: ACTIVE | OUTPATIENT
Start: 2020-05-21

## 2022-12-12 ENCOUNTER — APPOINTMENT (OUTPATIENT)
Dept: DERMATOLOGY | Facility: CLINIC | Age: 77
End: 2022-12-12

## 2023-02-10 ENCOUNTER — APPOINTMENT (OUTPATIENT)
Dept: DERMATOLOGY | Facility: CLINIC | Age: 78
End: 2023-02-10
Payer: MEDICARE

## 2023-02-10 PROCEDURE — 96372 THER/PROPH/DIAG INJ SC/IM: CPT

## 2023-03-06 RX ORDER — USTEKINUMAB 90 MG/ML
90 INJECTION, SOLUTION SUBCUTANEOUS
Qty: 2 | Refills: 0 | Status: DISCONTINUED | COMMUNITY
Start: 2021-10-27 | End: 2023-03-06

## 2023-03-07 ENCOUNTER — APPOINTMENT (OUTPATIENT)
Dept: DERMATOLOGY | Facility: CLINIC | Age: 78
End: 2023-03-07

## 2023-03-20 RX ORDER — USTEKINUMAB 90 MG/ML
90 INJECTION, SOLUTION SUBCUTANEOUS
Qty: 2 | Refills: 0 | Status: ACTIVE | COMMUNITY
Start: 2023-03-20

## 2023-03-23 ENCOUNTER — APPOINTMENT (OUTPATIENT)
Dept: DERMATOLOGY | Facility: CLINIC | Age: 78
End: 2023-03-23
Payer: MEDICARE

## 2023-03-23 VITALS — BODY MASS INDEX: 28.88 KG/M2 | HEIGHT: 74 IN | WEIGHT: 225 LBS

## 2023-03-23 PROCEDURE — 99213 OFFICE O/P EST LOW 20 MIN: CPT

## 2023-03-23 RX ORDER — USTEKINUMAB 90 MG/ML
90 INJECTION, SOLUTION SUBCUTANEOUS
Qty: 0 | Refills: 0 | Status: COMPLETED | OUTPATIENT
Start: 2023-03-23

## 2023-03-23 RX ADMIN — USTEKINUMAB 0 MG/ML: 90 INJECTION, SOLUTION SUBCUTANEOUS at 00:00

## 2023-03-23 NOTE — PHYSICAL EXAM
[Alert] : alert [Oriented x 3] : ~L oriented x 3 [Well Nourished] : well nourished [FreeTextEntry3] : Erythematous scaling plaques of the UE's including the hands, LE's with few papules of the abdomen.

## 2023-03-23 NOTE — HISTORY OF PRESENT ILLNESS
[FreeTextEntry1] : Psoriasis on Stelara. [de-identified] : Improved but still with significant dz.

## 2023-03-23 NOTE — ASSESSMENT
[FreeTextEntry1] : Psoriasis\par Education.\par Topicals - betamethasone and calcipotriene.\par Emollients.

## 2023-06-02 RX ORDER — USTEKINUMAB 90 MG/ML
INJECTION, SOLUTION SUBCUTANEOUS
Qty: 1 | Refills: 3 | Status: ACTIVE | COMMUNITY
Start: 2021-10-27

## 2023-06-21 ENCOUNTER — APPOINTMENT (OUTPATIENT)
Dept: DERMATOLOGY | Facility: CLINIC | Age: 78
End: 2023-06-21
Payer: MEDICARE

## 2023-06-21 DIAGNOSIS — Z41.1 ENCOUNTER FOR COSMETIC SURGERY: ICD-10-CM

## 2023-06-21 PROCEDURE — 99214 OFFICE O/P EST MOD 30 MIN: CPT

## 2023-06-21 PROCEDURE — D0123: CPT

## 2023-06-21 RX ORDER — USTEKINUMAB 90 MG/ML
INJECTION, SOLUTION SUBCUTANEOUS
Qty: 0 | Refills: 0 | Status: COMPLETED | OUTPATIENT
Start: 2023-06-21

## 2023-06-21 RX ADMIN — USTEKINUMAB 0 MG/ML: 90 INJECTION, SOLUTION SUBCUTANEOUS at 00:00

## 2023-06-21 NOTE — HISTORY OF PRESENT ILLNESS
[FreeTextEntry1] : Psoriasis on Stelara. [de-identified] : Flaring after just 8 weeks consistently.

## 2023-06-21 NOTE — ASSESSMENT
[FreeTextEntry1] : Psoriasis\par Check labs.\par Will change to Skyrizi, given the lack of a consistent durable response to Stelara.\par Discussed at length with patient.

## 2023-06-21 NOTE — PHYSICAL EXAM
[FreeTextEntry3] : Erythematous scaling patches, plaques of the dorsal hands, elbows, forearms, knees.

## 2023-06-26 LAB
ALBUMIN SERPL ELPH-MCNC: 4.1 G/DL
ALP BLD-CCNC: 50 U/L
ALT SERPL-CCNC: 17 U/L
ANION GAP SERPL CALC-SCNC: 14 MMOL/L
AST SERPL-CCNC: 13 U/L
BILIRUB SERPL-MCNC: 0.2 MG/DL
BUN SERPL-MCNC: 16 MG/DL
CALCIUM SERPL-MCNC: 9.1 MG/DL
CHLORIDE SERPL-SCNC: 102 MMOL/L
CO2 SERPL-SCNC: 24 MMOL/L
CREAT SERPL-MCNC: 0.97 MG/DL
EGFR: 79 ML/MIN/1.73M2
GLUCOSE SERPL-MCNC: 233 MG/DL
HBV CORE IGG+IGM SER QL: NONREACTIVE
HBV SURFACE AG SER QL: NONREACTIVE
HCV AB SER QL: NONREACTIVE
HCV S/CO RATIO: 0.07 S/CO
M TB IFN-G BLD-IMP: NEGATIVE
POTASSIUM SERPL-SCNC: 4.2 MMOL/L
PROT SERPL-MCNC: 6.7 G/DL
QUANTIFERON TB PLUS MITOGEN MINUS NIL: 9.43 IU/ML
QUANTIFERON TB PLUS NIL: 0.01 IU/ML
QUANTIFERON TB PLUS TB1 MINUS NIL: 0 IU/ML
QUANTIFERON TB PLUS TB2 MINUS NIL: 0 IU/ML
SODIUM SERPL-SCNC: 141 MMOL/L

## 2023-06-27 LAB
HCT VFR BLD CALC: 36.1 %
HGB BLD-MCNC: 12.3 G/DL
MCHC RBC-ENTMCNC: 31.6 PG
MCHC RBC-ENTMCNC: 34.1 GM/DL
MCV RBC AUTO: 92.8 FL
PLATELET # BLD AUTO: 179 K/UL
RBC # BLD: 3.89 M/UL
RBC # FLD: 13 %
WBC # FLD AUTO: 5.5 K/UL

## 2023-07-12 RX ORDER — RISANKIZUMAB-RZAA 150 MG/ML
150 INJECTION SUBCUTANEOUS
Qty: 2 | Refills: 0 | Status: ACTIVE | COMMUNITY
Start: 2023-06-26

## 2023-08-01 ENCOUNTER — APPOINTMENT (OUTPATIENT)
Dept: DERMATOLOGY | Facility: CLINIC | Age: 78
End: 2023-08-01
Payer: MEDICARE

## 2023-08-01 PROCEDURE — 99213 OFFICE O/P EST LOW 20 MIN: CPT

## 2023-08-01 RX ORDER — RISANKIZUMAB-RZAA 150 MG/ML
150 INJECTION SUBCUTANEOUS
Qty: 0 | Refills: 0 | Status: COMPLETED | OUTPATIENT
Start: 2023-08-01

## 2023-08-01 RX ADMIN — RISANKIZUMAB-RZAA 0 MG/ML: 150 INJECTION SUBCUTANEOUS at 00:00

## 2023-08-01 NOTE — HISTORY OF PRESENT ILLNESS
[FreeTextEntry1] : Psoriasis, to start skyrizi today. [de-identified] : Patient changed from Stelara, as his psoriasis become less sensitive to that medication.

## 2023-08-23 ENCOUNTER — APPOINTMENT (OUTPATIENT)
Dept: DERMATOLOGY | Facility: CLINIC | Age: 78
End: 2023-08-23

## 2023-08-29 ENCOUNTER — APPOINTMENT (OUTPATIENT)
Dept: DERMATOLOGY | Facility: CLINIC | Age: 78
End: 2023-08-29
Payer: MEDICARE

## 2023-08-29 PROCEDURE — 99213 OFFICE O/P EST LOW 20 MIN: CPT

## 2023-08-29 RX ORDER — RISANKIZUMAB-RZAA 150 MG/ML
150 INJECTION SUBCUTANEOUS
Qty: 0 | Refills: 0 | Status: COMPLETED | OUTPATIENT
Start: 2023-08-29

## 2023-08-29 RX ADMIN — RISANKIZUMAB-RZAA 0 MG/ML: 150 INJECTION SUBCUTANEOUS at 00:00

## 2023-08-29 NOTE — HISTORY OF PRESENT ILLNESS
[FreeTextEntry1] : Psoriasis, for 2nd skyrizi loading dose today. [de-identified] : Tolerated the last injection without incident.

## 2023-11-15 ENCOUNTER — APPOINTMENT (OUTPATIENT)
Dept: DERMATOLOGY | Facility: CLINIC | Age: 78
End: 2023-11-15
Payer: MEDICARE

## 2023-11-15 VITALS — BODY MASS INDEX: 29.16 KG/M2 | HEIGHT: 73 IN | WEIGHT: 220 LBS

## 2023-11-15 PROCEDURE — 99213 OFFICE O/P EST LOW 20 MIN: CPT

## 2023-11-15 RX ORDER — RISANKIZUMAB-RZAA 150 MG/ML
150 INJECTION SUBCUTANEOUS
Qty: 0 | Refills: 0 | Status: COMPLETED | OUTPATIENT
Start: 2023-11-15

## 2023-11-15 RX ADMIN — RISANKIZUMAB-RZAA 0 MG/ML: 150 INJECTION SUBCUTANEOUS at 00:00

## 2023-11-23 ENCOUNTER — NON-APPOINTMENT (OUTPATIENT)
Age: 78
End: 2023-11-23

## 2024-01-09 RX ORDER — RISANKIZUMAB-RZAA 150 MG/ML
150 INJECTION SUBCUTANEOUS
Qty: 1 | Refills: 3 | Status: ACTIVE | COMMUNITY
Start: 2023-06-26

## 2024-02-14 ENCOUNTER — APPOINTMENT (OUTPATIENT)
Dept: DERMATOLOGY | Facility: CLINIC | Age: 79
End: 2024-02-14
Payer: MEDICARE

## 2024-02-14 VITALS — WEIGHT: 220 LBS | BODY MASS INDEX: 29.16 KG/M2 | HEIGHT: 73 IN

## 2024-02-14 DIAGNOSIS — L28.0 LICHEN SIMPLEX CHRONICUS: ICD-10-CM

## 2024-02-14 PROCEDURE — 99213 OFFICE O/P EST LOW 20 MIN: CPT

## 2024-02-14 RX ORDER — RISANKIZUMAB-RZAA 150 MG/ML
150 INJECTION SUBCUTANEOUS
Qty: 0 | Refills: 0 | Status: COMPLETED | OUTPATIENT
Start: 2024-02-14

## 2024-02-14 RX ADMIN — RISANKIZUMAB-RZAA 0 MG/ML: 150 INJECTION SUBCUTANEOUS at 00:00

## 2024-02-14 NOTE — ASSESSMENT
[FreeTextEntry1] : Psoriasis.with some LSC on the legs. Continue skyrizi q 3 months - DVW. Use betamethasone (getting from the [facility]) bid as needed for itching on the legs. Emollients.

## 2024-05-28 ENCOUNTER — APPOINTMENT (OUTPATIENT)
Dept: DERMATOLOGY | Facility: CLINIC | Age: 79
End: 2024-05-28
Payer: MEDICARE

## 2024-05-28 DIAGNOSIS — L40.0 PSORIASIS VULGARIS: ICD-10-CM

## 2024-05-28 PROCEDURE — 99213 OFFICE O/P EST LOW 20 MIN: CPT

## 2024-05-28 NOTE — PHYSICAL EXAM
[Alert] : alert [Oriented x 3] : ~L oriented x 3 [Well Nourished] : well nourished [FreeTextEntry3] : Currently psoriasis is clear.

## 2024-06-14 NOTE — PHYSICAL THERAPY INITIAL EVALUATION ADULT - ASSISTIVE DEVICE:SIT/SUPINE, REHAB EVAL
Detail Level: Detailed Doxycycline Pregnancy And Lactation Text: This medication is Pregnancy Category D and not consider safe during pregnancy. It is also excreted in breast milk but is considered safe for shorter treatment courses. Bactrim Counseling:  I discussed with the patient the risks of sulfa antibiotics including but not limited to GI upset, allergic reaction, drug rash, diarrhea, dizziness, photosensitivity, and yeast infections.  Rarely, more serious reactions can occur including but not limited to aplastic anemia, agranulocytosis, methemoglobinemia, blood dyscrasias, liver or kidney failure, lung infiltrates or desquamative/blistering drug rashes. Minocycline Counseling: Patient advised regarding possible photosensitivity and discoloration of the teeth, skin, lips, tongue and gums.  Patient instructed to avoid sunlight, if possible.  When exposed to sunlight, patients should wear protective clothing, sunglasses, and sunscreen.  The patient was instructed to call the office immediately if the following severe adverse effects occur:  hearing changes, easy bruising/bleeding, severe headache, or vision changes.  The patient verbalized understanding of the proper use and possible adverse effects of minocycline.  All of the patient's questions and concerns were addressed. Tetracycline Pregnancy And Lactation Text: This medication is Pregnancy Category D and not consider safe during pregnancy. It is also excreted in breast milk. Spironolactone Pregnancy And Lactation Text: This medication can cause feminization of the male fetus and should be avoided during pregnancy. The active metabolite is also found in breast milk. High Dose Vitamin A Counseling: Side effects reviewed, pt to contact office should one occur. Benzoyl Peroxide Counseling: Patient counseled that medicine may cause skin irritation and bleach clothing.  In the event of skin irritation, the patient was advised to reduce the amount of the drug applied or use it less frequently.   The patient verbalized understanding of the proper use and possible adverse effects of benzoyl peroxide.  All of the patient's questions and concerns were addressed. Topical Clindamycin Pregnancy And Lactation Text: This medication is Pregnancy Category B and is considered safe during pregnancy. It is unknown if it is excreted in breast milk. Azithromycin Counseling:  I discussed with the patient the risks of azithromycin including but not limited to GI upset, allergic reaction, drug rash, diarrhea, and yeast infections. Dapsone Pregnancy And Lactation Text: This medication is Pregnancy Category C and is not considered safe during pregnancy or breast feeding. Birth Control Pills Pregnancy And Lactation Text: This medication should be avoided if pregnant and for the first 30 days post-partum. Isotretinoin Counseling: Patient should get monthly blood tests, not donate blood, not drive at night if vision affected, not share medication, and not undergo elective surgery for 6 months after tx completed. Side effects reviewed, pt to contact office should one occur. Azelaic Acid Counseling: Patient counseled that medicine may cause skin irritation and to avoid applying near the eyes.  In the event of skin irritation, the patient was advised to reduce the amount of the drug applied or use it less frequently.   The patient verbalized understanding of the proper use and possible adverse effects of azelaic acid.  All of the patient's questions and concerns were addressed. Tazorac Pregnancy And Lactation Text: This medication is not safe during pregnancy. It is unknown if this medication is excreted in breast milk. Winlevi Counseling:  I discussed with the patient the risks of topical clascoterone including but not limited to erythema, scaling, itching, and stinging. Patient voiced their understanding. Erythromycin Counseling:  I discussed with the patient the risks of erythromycin including but not limited to GI upset, allergic reaction, drug rash, diarrhea, increase in liver enzymes, and yeast infections. Aklief counseling:  Patient advised to apply a pea-sized amount only at bedtime and wait 30 minutes after washing their face before applying.  If too drying, patient may add a non-comedogenic moisturizer.  The most commonly reported side effects including irritation, redness, scaling, dryness, stinging, burning, itching, and increased risk of sunburn.  The patient verbalized understanding of the proper use and possible adverse effects of retinoids.  All of the patient's questions and concerns were addressed. Topical Retinoid Pregnancy And Lactation Text: This medication is Pregnancy Category C. It is unknown if this medication is excreted in breast milk. Include Pregnancy/Lactation Warning?: No Tetracycline Counseling: Patient counseled regarding possible photosensitivity and increased risk for sunburn.  Patient instructed to avoid sunlight, if possible.  When exposed to sunlight, patients should wear protective clothing, sunglasses, and sunscreen.  The patient was instructed to call the office immediately if the following severe adverse effects occur:  hearing changes, easy bruising/bleeding, severe headache, or vision changes.  The patient verbalized understanding of the proper use and possible adverse effects of tetracycline.  All of the patient's questions and concerns were addressed. Patient understands to avoid pregnancy while on therapy due to potential birth defects. Azithromycin Pregnancy And Lactation Text: This medication is considered safe during pregnancy and is also secreted in breast milk. Doxycycline Counseling:  Patient counseled regarding possible photosensitivity and increased risk for sunburn.  Patient instructed to avoid sunlight, if possible.  When exposed to sunlight, patients should wear protective clothing, sunglasses, and sunscreen.  The patient was instructed to call the office immediately if the following severe adverse effects occur:  hearing changes, easy bruising/bleeding, severe headache, or vision changes.  The patient verbalized understanding of the proper use and possible adverse effects of doxycycline.  All of the patient's questions and concerns were addressed. High Dose Vitamin A Pregnancy And Lactation Text: High dose vitamin A therapy is contraindicated during pregnancy and breast feeding. Bactrim Pregnancy And Lactation Text: This medication is Pregnancy Category D and is known to cause fetal risk.  It is also excreted in breast milk. Topical Sulfur Applications Counseling: Topical Sulfur Counseling: Patient counseled that this medication may cause skin irritation or allergic reactions.  In the event of skin irritation, the patient was advised to reduce the amount of the drug applied or use it less frequently.   The patient verbalized understanding of the proper use and possible adverse effects of topical sulfur application.  All of the patient's questions and concerns were addressed. Benzoyl Peroxide Pregnancy And Lactation Text: This medication is Pregnancy Category C. It is unknown if benzoyl peroxide is excreted in breast milk. Isotretinoin Pregnancy And Lactation Text: This medication is Pregnancy Category X and is considered extremely dangerous during pregnancy. It is unknown if it is excreted in breast milk. Spironolactone Counseling: Patient advised regarding risks of diarrhea, abdominal pain, hyperkalemia, birth defects (for female patients), liver toxicity and renal toxicity. The patient may need blood work to monitor liver and kidney function and potassium levels while on therapy. The patient verbalized understanding of the proper use and possible adverse effects of spironolactone.  All of the patient's questions and concerns were addressed. Dapsone Counseling: I discussed with the patient the risks of dapsone including but not limited to hemolytic anemia, agranulocytosis, rashes, methemoglobinemia, kidney failure, peripheral neuropathy, headaches, GI upset, and liver toxicity.  Patients who start dapsone require monitoring including baseline LFTs and weekly CBCs for the first month, then every month thereafter.  The patient verbalized understanding of the proper use and possible adverse effects of dapsone.  All of the patient's questions and concerns were addressed. Azelaic Acid Pregnancy And Lactation Text: This medication is considered safe during pregnancy and breast feeding. Topical Clindamycin Counseling: Patient counseled that this medication may cause skin irritation or allergic reactions.  In the event of skin irritation, the patient was advised to reduce the amount of the drug applied or use it less frequently.   The patient verbalized understanding of the proper use and possible adverse effects of clindamycin.  All of the patient's questions and concerns were addressed. Topical Sulfur Applications Pregnancy And Lactation Text: This medication is Pregnancy Category C and has an unknown safety profile during pregnancy. It is unknown if this topical medication is excreted in breast milk. Topical Retinoid counseling:  Patient advised to apply a pea-sized amount only at bedtime and wait 30 minutes after washing their face before applying.  If too drying, patient may add a non-comedogenic moisturizer. The patient verbalized understanding of the proper use and possible adverse effects of retinoids.  All of the patient's questions and concerns were addressed. Aklief Pregnancy And Lactation Text: It is unknown if this medication is safe to use during pregnancy.  It is unknown if this medication is excreted in breast milk.  Breastfeeding women should use the topical cream on the smallest area of the skin for the shortest time needed while breastfeeding.  Do not apply to nipple and areola. overhead maikole Tazorac Counseling:  Patient advised that medication is irritating and drying.  Patient may need to apply sparingly and wash off after an hour before eventually leaving it on overnight.  The patient verbalized understanding of the proper use and possible adverse effects of tazorac.  All of the patient's questions and concerns were addressed. Erythromycin Pregnancy And Lactation Text: This medication is Pregnancy Category B and is considered safe during pregnancy. It is also excreted in breast milk. Winlevi Pregnancy And Lactation Text: This medication is considered safe during pregnancy and breastfeeding. Sarecycline Counseling: Patient advised regarding possible photosensitivity and discoloration of the teeth, skin, lips, tongue and gums.  Patient instructed to avoid sunlight, if possible.  When exposed to sunlight, patients should wear protective clothing, sunglasses, and sunscreen.  The patient was instructed to call the office immediately if the following severe adverse effects occur:  hearing changes, easy bruising/bleeding, severe headache, or vision changes.  The patient verbalized understanding of the proper use and possible adverse effects of sarecycline.  All of the patient's questions and concerns were addressed. Birth Control Pills Counseling: Birth Control Pill Counseling: I discussed with the patient the potential side effects of OCPs including but not limited to increased risk of stroke, heart attack, thrombophlebitis, deep venous thrombosis, hepatic adenomas, breast changes, GI upset, headaches, and depression.  The patient verbalized understanding of the proper use and possible adverse effects of OCPs. All of the patient's questions and concerns were addressed.

## 2024-09-04 ENCOUNTER — APPOINTMENT (OUTPATIENT)
Dept: DERMATOLOGY | Facility: CLINIC | Age: 79
End: 2024-09-04
Payer: MEDICARE

## 2024-09-04 DIAGNOSIS — L40.0 PSORIASIS VULGARIS: ICD-10-CM

## 2024-09-04 DIAGNOSIS — L72.3 SEBACEOUS CYST: ICD-10-CM

## 2024-09-04 DIAGNOSIS — L30.4 ERYTHEMA INTERTRIGO: ICD-10-CM

## 2024-09-04 DIAGNOSIS — L70.0 ACNE VULGARIS: ICD-10-CM

## 2024-09-04 DIAGNOSIS — R21 RASH AND OTHER NONSPECIFIC SKIN ERUPTION: ICD-10-CM

## 2024-09-04 PROCEDURE — 99213 OFFICE O/P EST LOW 20 MIN: CPT

## 2024-09-04 NOTE — HISTORY OF PRESENT ILLNESS
[FreeTextEntry1] : Psoriasis on [place]. [de-identified] : Psoriasis is doing very well, without flares.

## 2024-10-23 ENCOUNTER — NON-APPOINTMENT (OUTPATIENT)
Age: 79
End: 2024-10-23

## 2024-12-05 ENCOUNTER — APPOINTMENT (OUTPATIENT)
Dept: DERMATOLOGY | Facility: CLINIC | Age: 79
End: 2024-12-05

## 2024-12-10 ENCOUNTER — APPOINTMENT (OUTPATIENT)
Dept: DERMATOLOGY | Facility: CLINIC | Age: 79
End: 2024-12-10
Payer: MEDICARE

## 2024-12-10 ENCOUNTER — NON-APPOINTMENT (OUTPATIENT)
Age: 79
End: 2024-12-10

## 2024-12-10 DIAGNOSIS — L40.0 PSORIASIS VULGARIS: ICD-10-CM

## 2024-12-10 PROCEDURE — 99213 OFFICE O/P EST LOW 20 MIN: CPT

## 2025-01-31 ENCOUNTER — NON-APPOINTMENT (OUTPATIENT)
Age: 80
End: 2025-01-31

## 2025-03-27 ENCOUNTER — APPOINTMENT (OUTPATIENT)
Dept: DERMATOLOGY | Facility: CLINIC | Age: 80
End: 2025-03-27
Payer: MEDICARE

## 2025-03-27 DIAGNOSIS — L40.0 PSORIASIS VULGARIS: ICD-10-CM

## 2025-03-27 DIAGNOSIS — L72.0 EPIDERMAL CYST: ICD-10-CM

## 2025-03-27 DIAGNOSIS — L82.1 OTHER SEBORRHEIC KERATOSIS: ICD-10-CM

## 2025-03-27 DIAGNOSIS — D18.01 HEMANGIOMA OF SKIN AND SUBCUTANEOUS TISSUE: ICD-10-CM

## 2025-03-27 PROCEDURE — 99213 OFFICE O/P EST LOW 20 MIN: CPT

## 2025-07-01 ENCOUNTER — APPOINTMENT (OUTPATIENT)
Dept: DERMATOLOGY | Facility: CLINIC | Age: 80
End: 2025-07-01
Payer: MEDICARE

## 2025-07-01 PROCEDURE — 99213 OFFICE O/P EST LOW 20 MIN: CPT

## 2025-07-26 DIAGNOSIS — M25.561 PAIN IN RIGHT KNEE: ICD-10-CM

## 2025-07-28 ENCOUNTER — APPOINTMENT (OUTPATIENT)
Dept: ORTHOPEDIC SURGERY | Facility: CLINIC | Age: 80
End: 2025-07-28